# Patient Record
Sex: FEMALE | Race: WHITE | Employment: OTHER | URBAN - METROPOLITAN AREA
[De-identification: names, ages, dates, MRNs, and addresses within clinical notes are randomized per-mention and may not be internally consistent; named-entity substitution may affect disease eponyms.]

---

## 2017-07-24 ENCOUNTER — HOSPITAL ENCOUNTER (OUTPATIENT)
Dept: CT IMAGING | Age: 57
Discharge: HOME | End: 2017-07-24
Attending: NURSE PRACTITIONER
Payer: OTHER GOVERNMENT

## 2017-07-24 DIAGNOSIS — R91.1 LUNG NODULE: Chronic | ICD-10-CM

## 2017-07-24 PROCEDURE — 71250 CT THORAX DX C-: CPT

## 2017-07-24 NOTE — PROGRESS NOTES
Left message via voicemail to please give me a call back as soon as they are available. // Radha BARBER

## 2017-07-24 NOTE — PROGRESS NOTES
Notified patient of their CT scan results. Explained to the patient that per Riley Ivan the CT showed stable tiny left lung nodules and that Riley Ivan recommends for the patient to have a repeat CT scan in 1 year. Patient was fine with this and did not have any further questions. Order will be placed for 1 year. // Bethany BARBER

## 2017-10-13 PROBLEM — M54.2 CHRONIC NECK PAIN: Status: ACTIVE | Noted: 2017-04-17

## 2017-10-13 PROBLEM — G89.29 CHRONIC NECK PAIN: Status: ACTIVE | Noted: 2017-04-17

## 2018-01-19 PROBLEM — E03.9 ACQUIRED HYPOTHYROIDISM: Status: ACTIVE | Noted: 2018-01-19

## 2018-01-19 PROBLEM — E78.00 PURE HYPERCHOLESTEROLEMIA: Status: ACTIVE | Noted: 2018-01-19

## 2018-07-06 PROBLEM — R00.2 PALPITATIONS: Status: ACTIVE | Noted: 2018-07-06

## 2018-07-12 PROBLEM — I49.1 PAC (PREMATURE ATRIAL CONTRACTION): Status: ACTIVE | Noted: 2018-07-12

## 2018-07-12 PROBLEM — E78.2 MIXED HYPERLIPIDEMIA: Status: ACTIVE | Noted: 2018-07-12

## 2018-07-25 ENCOUNTER — HOSPITAL ENCOUNTER (OUTPATIENT)
Dept: CT IMAGING | Age: 58
End: 2018-07-25
Attending: NURSE PRACTITIONER
Payer: SELF-PAY

## 2018-07-25 ENCOUNTER — HOSPITAL ENCOUNTER (OUTPATIENT)
Dept: CT IMAGING | Age: 58
Discharge: HOME | End: 2018-07-25
Attending: INTERNAL MEDICINE
Payer: SELF-PAY

## 2018-07-25 DIAGNOSIS — I49.1 PAC (PREMATURE ATRIAL CONTRACTION): ICD-10-CM

## 2018-07-25 DIAGNOSIS — R00.2 PALPITATIONS: ICD-10-CM

## 2018-07-25 PROCEDURE — 75571 CT HRT W/O DYE W/CA TEST: CPT

## 2018-07-31 NOTE — PROGRESS NOTES
Called and informed pt of Calcium Score-72 indicating mild plaque. Reminded pt of F/U apt w/Dr Miguel Aguilar next mth. Pt voiced understanding.

## 2018-08-21 PROBLEM — R93.1 AGATSTON CORONARY ARTERY CALCIUM SCORE LESS THAN 100: Status: ACTIVE | Noted: 2018-08-21

## 2018-09-20 ENCOUNTER — HOSPITAL ENCOUNTER (OUTPATIENT)
Dept: CT IMAGING | Age: 58
Discharge: HOME | End: 2018-09-20
Attending: NURSE PRACTITIONER
Payer: OTHER GOVERNMENT

## 2018-09-20 DIAGNOSIS — R91.1 LUNG NODULE: ICD-10-CM

## 2018-09-20 PROCEDURE — 71250 CT THORAX DX C-: CPT

## 2018-09-20 NOTE — PROGRESS NOTES
Please let patient know that nodules are stable. She will need yearly screening CT of chest until smoke free for 15 years or until age 70 whichever comes first.  We will further address those recommendations at next visit.

## 2018-09-20 NOTE — PROGRESS NOTES
Pt was notified of the results. She was told that the nodules are stable. Pt verbalized understanding. She also agreed to annual CT scan. Order placed.

## 2019-07-18 ENCOUNTER — HOSPITAL ENCOUNTER (OUTPATIENT)
Dept: CT IMAGING | Age: 59
Discharge: HOME | End: 2019-07-18
Attending: NURSE PRACTITIONER

## 2019-07-18 DIAGNOSIS — R91.1 LUNG NODULE: Chronic | ICD-10-CM

## 2019-07-18 DIAGNOSIS — F17.210 CIGARETTE NICOTINE DEPENDENCE WITHOUT COMPLICATION: Chronic | ICD-10-CM

## 2019-12-24 ENCOUNTER — HOSPITAL ENCOUNTER (OUTPATIENT)
Dept: LAB | Age: 59
Discharge: HOME | End: 2019-12-24
Payer: OTHER GOVERNMENT

## 2019-12-24 PROBLEM — E83.52 HYPERCALCEMIA: Status: ACTIVE | Noted: 2019-12-24

## 2019-12-24 LAB
CALCIUM SERPL-MCNC: 10.6 MG/DL (ref 8.3–10.4)
PTH-INTACT SERPL-MCNC: 40.3 PG/ML (ref 18.5–88)

## 2019-12-24 PROCEDURE — 83970 ASSAY OF PARATHORMONE: CPT

## 2019-12-24 PROCEDURE — 36415 COLL VENOUS BLD VENIPUNCTURE: CPT

## 2020-09-01 ENCOUNTER — HOSPITAL ENCOUNTER (OUTPATIENT)
Dept: CT IMAGING | Age: 60
Discharge: HOME | End: 2020-09-01
Attending: FAMILY MEDICINE
Payer: OTHER GOVERNMENT

## 2020-09-01 DIAGNOSIS — R63.4 UNINTENTIONAL WEIGHT LOSS: ICD-10-CM

## 2020-09-01 DIAGNOSIS — R10.32 LLQ PAIN: ICD-10-CM

## 2020-09-01 DIAGNOSIS — R91.1 PULMONARY NODULE: ICD-10-CM

## 2020-09-01 DIAGNOSIS — F17.200 SMOKER: ICD-10-CM

## 2020-09-01 PROCEDURE — 74011000258 HC RX REV CODE- 258: Performed by: FAMILY MEDICINE

## 2020-09-01 PROCEDURE — 74011636320 HC RX REV CODE- 636/320: Performed by: FAMILY MEDICINE

## 2020-09-01 PROCEDURE — 74177 CT ABD & PELVIS W/CONTRAST: CPT

## 2020-09-01 PROCEDURE — 74011000636 HC RX REV CODE- 636: Performed by: FAMILY MEDICINE

## 2020-09-01 RX ORDER — SODIUM CHLORIDE 0.9 % (FLUSH) 0.9 %
10 SYRINGE (ML) INJECTION
Status: COMPLETED | OUTPATIENT
Start: 2020-09-01 | End: 2020-09-01

## 2020-09-01 RX ADMIN — Medication 10 ML: at 11:33

## 2020-09-01 RX ADMIN — DIATRIZOATE MEGLUMINE AND DIATRIZOATE SODIUM 15 ML: 660; 100 LIQUID ORAL; RECTAL at 11:33

## 2020-09-01 RX ADMIN — IOPAMIDOL 100 ML: 755 INJECTION, SOLUTION INTRAVENOUS at 11:33

## 2020-09-01 RX ADMIN — SODIUM CHLORIDE 100 ML: 900 INJECTION, SOLUTION INTRAVENOUS at 11:33

## 2020-10-05 ENCOUNTER — HOSPITAL ENCOUNTER (OUTPATIENT)
Dept: LAB | Age: 60
Discharge: HOME | End: 2020-10-05

## 2020-10-05 PROCEDURE — 88305 TISSUE EXAM BY PATHOLOGIST: CPT

## 2020-10-05 PROCEDURE — 88312 SPECIAL STAINS GROUP 1: CPT

## 2021-01-07 ENCOUNTER — HOSPITAL ENCOUNTER (OUTPATIENT)
Dept: LAB | Age: 61
Discharge: HOME | End: 2021-01-07
Payer: OTHER GOVERNMENT

## 2021-01-07 DIAGNOSIS — E78.00 PURE HYPERCHOLESTEROLEMIA: ICD-10-CM

## 2021-01-07 LAB
CHOLEST SERPL-MCNC: 252 MG/DL
HDLC SERPL-MCNC: 51 MG/DL (ref 40–60)
HDLC SERPL: 4.9 {RATIO}
LDLC SERPL CALC-MCNC: 153.8 MG/DL
LIPID PROFILE,FLP: ABNORMAL
TRIGL SERPL-MCNC: 236 MG/DL (ref 35–150)
VLDLC SERPL CALC-MCNC: 47.2 MG/DL (ref 6–23)

## 2021-01-07 PROCEDURE — 36415 COLL VENOUS BLD VENIPUNCTURE: CPT

## 2021-01-07 PROCEDURE — 80061 LIPID PANEL: CPT

## 2021-05-21 ENCOUNTER — HOSPITAL ENCOUNTER (OUTPATIENT)
Dept: MAMMOGRAPHY | Age: 61
Discharge: HOME | End: 2021-05-21
Attending: FAMILY MEDICINE

## 2021-05-21 DIAGNOSIS — Z12.31 ENCOUNTER FOR SCREENING MAMMOGRAM FOR MALIGNANT NEOPLASM OF BREAST: ICD-10-CM

## 2022-01-20 ENCOUNTER — HOSPITAL ENCOUNTER (OUTPATIENT)
Dept: CT IMAGING | Age: 62
Discharge: HOME | End: 2022-01-20
Attending: FAMILY MEDICINE
Payer: OTHER GOVERNMENT

## 2022-01-20 DIAGNOSIS — R91.1 PULMONARY NODULE: ICD-10-CM

## 2022-01-20 PROCEDURE — 71250 CT THORAX DX C-: CPT

## 2022-03-18 PROBLEM — E83.52 HYPERCALCEMIA: Status: ACTIVE | Noted: 2019-12-24

## 2022-03-18 PROBLEM — E03.9 ACQUIRED HYPOTHYROIDISM: Status: ACTIVE | Noted: 2018-01-19

## 2022-03-19 PROBLEM — M54.2 CHRONIC NECK PAIN: Status: ACTIVE | Noted: 2017-04-17

## 2022-03-19 PROBLEM — R93.1 AGATSTON CORONARY ARTERY CALCIUM SCORE LESS THAN 100: Status: ACTIVE | Noted: 2018-08-21

## 2022-03-19 PROBLEM — G89.29 CHRONIC NECK PAIN: Status: ACTIVE | Noted: 2017-04-17

## 2022-03-19 PROBLEM — E78.2 MIXED HYPERLIPIDEMIA: Status: ACTIVE | Noted: 2018-07-12

## 2022-03-19 PROBLEM — I49.1 PAC (PREMATURE ATRIAL CONTRACTION): Status: ACTIVE | Noted: 2018-07-12

## 2022-03-19 PROBLEM — E78.00 PURE HYPERCHOLESTEROLEMIA: Status: ACTIVE | Noted: 2018-01-19

## 2022-03-20 PROBLEM — R00.2 PALPITATIONS: Status: ACTIVE | Noted: 2018-07-06

## 2022-07-19 DIAGNOSIS — I10 HYPERTENSION, UNSPECIFIED TYPE: Primary | ICD-10-CM

## 2022-07-19 DIAGNOSIS — E03.9 ACQUIRED HYPOTHYROIDISM: ICD-10-CM

## 2022-07-19 DIAGNOSIS — E55.9 VITAMIN D DEFICIENCY: ICD-10-CM

## 2022-07-19 DIAGNOSIS — E78.00 PURE HYPERCHOLESTEROLEMIA: ICD-10-CM

## 2022-07-21 ENCOUNTER — NURSE ONLY (OUTPATIENT)
Dept: INTERNAL MEDICINE CLINIC | Facility: CLINIC | Age: 62
End: 2022-07-21

## 2022-07-21 DIAGNOSIS — E55.9 VITAMIN D DEFICIENCY: ICD-10-CM

## 2022-07-21 DIAGNOSIS — I10 HYPERTENSION, UNSPECIFIED TYPE: ICD-10-CM

## 2022-07-21 DIAGNOSIS — E03.9 ACQUIRED HYPOTHYROIDISM: ICD-10-CM

## 2022-07-21 DIAGNOSIS — E78.00 PURE HYPERCHOLESTEROLEMIA: Primary | ICD-10-CM

## 2022-07-21 LAB
25(OH)D3 SERPL-MCNC: 24.1 NG/ML (ref 30–100)
ALBUMIN SERPL-MCNC: 4.3 G/DL (ref 3.5–5)
ALBUMIN/GLOB SERPL: 1.1 {RATIO} (ref 1.2–3.5)
ALP SERPL-CCNC: 68 U/L (ref 50–136)
ALT SERPL-CCNC: 19 U/L (ref 12–65)
ANION GAP SERPL CALC-SCNC: 7 MMOL/L (ref 7–16)
AST SERPL-CCNC: 24 U/L (ref 15–37)
BILIRUB SERPL-MCNC: 0.5 MG/DL (ref 0.2–1.1)
BUN SERPL-MCNC: 15 MG/DL (ref 6–23)
CALCIUM SERPL-MCNC: 10 MG/DL (ref 8.3–10.4)
CHLORIDE SERPL-SCNC: 106 MMOL/L (ref 98–107)
CHOLEST SERPL-MCNC: 234 MG/DL
CO2 SERPL-SCNC: 24 MMOL/L (ref 21–32)
CREAT SERPL-MCNC: 0.9 MG/DL (ref 0.6–1)
CREAT UR-MCNC: 65 MG/DL
ERYTHROCYTE [DISTWIDTH] IN BLOOD BY AUTOMATED COUNT: 13 % (ref 11.9–14.6)
GLOBULIN SER CALC-MCNC: 3.8 G/DL (ref 2.3–3.5)
GLUCOSE SERPL-MCNC: 111 MG/DL (ref 65–100)
HCT VFR BLD AUTO: 44.4 % (ref 35.8–46.3)
HDLC SERPL-MCNC: 44 MG/DL (ref 40–60)
HDLC SERPL: 5.3 {RATIO}
HGB BLD-MCNC: 14.6 G/DL (ref 11.7–15.4)
LDLC SERPL CALC-MCNC: 140.4 MG/DL
MCH RBC QN AUTO: 34 PG (ref 26.1–32.9)
MCHC RBC AUTO-ENTMCNC: 32.9 G/DL (ref 31.4–35)
MCV RBC AUTO: 103.5 FL (ref 79.6–97.8)
MICROALBUMIN UR-MCNC: <0.5 MG/DL
MICROALBUMIN/CREAT UR-RTO: NORMAL MG/G
NRBC # BLD: 0 K/UL (ref 0–0.2)
PLATELET # BLD AUTO: 264 K/UL (ref 150–450)
PMV BLD AUTO: 11.1 FL (ref 9.4–12.3)
POTASSIUM SERPL-SCNC: 4.6 MMOL/L (ref 3.5–5.1)
PROT SERPL-MCNC: 8.1 G/DL (ref 6.3–8.2)
RBC # BLD AUTO: 4.29 M/UL (ref 4.05–5.2)
SODIUM SERPL-SCNC: 137 MMOL/L (ref 136–145)
TRIGL SERPL-MCNC: 248 MG/DL (ref 35–150)
TSH, 3RD GENERATION: 5.39 UIU/ML (ref 0.36–3.74)
VLDLC SERPL CALC-MCNC: 49.6 MG/DL (ref 6–23)
WBC # BLD AUTO: 7.7 K/UL (ref 4.3–11.1)

## 2022-07-27 NOTE — PROGRESS NOTES
2022    TELEHEALTH EVALUATION -- Audio/Visual (During SFZIE-44 public health emergency)    HPI:    Taras Favre (:  1960) has requested an audio/video evaluation for the following concern(s):    Chief Complaint   Patient presents with    Follow-up Chronic Condition     Hypothyroidism and Hyperlipidemia f/u not on any medication, asymptomatic. Wants to discuss about medication for cholesterol     Medication Refill     Premarin        Vitamin D deficiency  Taking supplement on a irregular basis. Current level:   Lab Results   Component Value Date/Time    VITD25 24.1 2022 07:57 AM      Hypothyroidism  The patient is a 63 y.o. female who is seen for follow up of hypothyroidism. Current symptoms: none. Patient denies change in energy level, diarrhea, heat / cold intolerance, nervousness, and palpitations. Symptoms are stable and well controlled. The patient is compliant. She also sees her endocrinologist regularly  Most recent lab:   Lab Results   Component Value Date    TSH 6.580 (H) 2022         Hypertension   She is exercising and is adherent to low salt diet. Cardiac symptoms: none. Cardiovascular risk factors: dyslipidemia and hypertension. Blood pressure is not measured at home. Hyperlipidemia  The patient is following a low fat diet and is exercising regularly. She is tolerating current treatment well and  is compliant but  Has not been able to get Repatha the past 2 months.   Patient is not having associated symptoms of shortness of breath, chest pain, rapid heart rate, irregular heart rate, and dizziness    Patient labs are YOUR LAST LIPID PROFILE:   Lab Results   Component Value Date    CHOL 234 (H) 2022    CHOL 131 2022    CHOL 129 2021     Lab Results   Component Value Date    TRIG 248 (H) 2022    TRIG 291 (H) 2022    TRIG 233 (H) 2021     Lab Results   Component Value Date    HDL 44 2022    HDL 51 2022    HDL 57 05/07/2021     Lab Results   Component Value Date    LDLCALC 140.4 (H) 07/21/2022    LDLCALC 37 01/20/2022    LDLCALC 36 05/07/2021     Lab Results   Component Value Date    LABVLDL 49.6 (H) 07/21/2022    LABVLDL 47.2 (H) 01/07/2021    LABVLDL 45 (H) 07/11/2019    VLDL 43 (H) 01/20/2022    VLDL 36 05/07/2021     Lab Results   Component Value Date    CHOLHDLRATIO 5.3 07/21/2022    CHOLHDLRATIO 4.9 01/07/2021     Pulmonary nodule  Being followed by pulmonary    GERD  stable    Review of Systems   Constitutional:  Negative for fatigue. HENT:  Negative for congestion. Respiratory:  Negative for shortness of breath. Cardiovascular:  Negative for chest pain. Gastrointestinal:  Negative for abdominal pain and blood in stool. Genitourinary:  Negative for difficulty urinating. Skin:  Negative for rash. Neurological:  Negative for headaches. Prior to Visit Medications    Medication Sig Taking? Authorizing Provider   estrogens, conjugated, (PREMARIN) 0.625 MG tablet Take 1 tablet by mouth in the morning.  Yes Monica Johnson MD   ergocalciferol (ERGOCALCIFEROL) 1.25 MG (46692 UT) capsule Take 50,000 Units by mouth every 7 days Yes Ar Automatic Reconciliation   Evolocumab (REPATHA SURECLICK) 079 MG/ML SOAJ Inject 140 mg into the skin every 14 days Yes Ar Automatic Reconciliation   fluticasone (FLONASE) 50 MCG/ACT nasal spray 2 sprays by Nasal route daily Yes Ar Automatic Reconciliation   lansoprazole (PREVACID) 15 MG delayed release capsule Take by mouth every morning (before breakfast) Yes Ar Automatic Reconciliation   metoprolol succinate (TOPROL XL) 25 MG extended release tablet Take 25 mg by mouth daily Yes Ar Automatic Reconciliation   valACYclovir (VALTREX) 500 MG tablet Take 500 mg by mouth 2 times daily Yes Ar Automatic Reconciliation       Social History     Tobacco Use    Smoking status: Every Day     Packs/day: 0.50     Types: Cigarettes    Smokeless tobacco: Never   Substance Use Topics Alcohol use: Yes     Alcohol/week: 2.0 standard drinks    Drug use: No            PHYSICAL EXAMINATION:  [ INSTRUCTIONS:  \"[x]\" Indicates a positive item  \"[]\" Indicates a negative item  -- DELETE ALL ITEMS NOT EXAMINED]  Vital Signs: (As obtained by patient/caregiver or practitioner observation)    Blood pressure-  Heart rate-    Respiratory rate-    Temperature-  Pulse oximetry-     Constitutional: [x] Appears well-developed and well-nourished [] No apparent distress      [] Abnormal-   Mental status  [x] Alert and awake  [] Oriented to person/place/time []Able to follow commands      Eyes:  EOM    []  Normal  [] Abnormal-  Sclera  []  Normal  [] Abnormal -         Discharge []  None visible  [] Abnormal -    HENT:   [] Normocephalic, atraumatic. [] Abnormal   [] Mouth/Throat: Mucous membranes are moist.     External Ears [] Normal  [] Abnormal-     Neck: [] No visualized mass     Pulmonary/Chest: [x] Respiratory effort normal.  [] No visualized signs of difficulty breathing or respiratory distress        [] Abnormal-      Musculoskeletal:   [] Normal gait with no signs of ataxia         [] Normal range of motion of neck        [] Abnormal-       Neurological:        [] No Facial Asymmetry (Cranial nerve 7 motor function) (limited exam to video visit)          [] No gaze palsy        [] Abnormal-         Skin:        [] No significant exanthematous lesions or discoloration noted on facial skin         [] Abnormal-            Psychiatric:       [] Normal Affect [] No Hallucinations        [] Abnormal-     Other pertinent observable physical exam findings-     ASSESSMENT/PLAN:   Diagnosis Orders   1. Postmenopausal HRT (hormone replacement therapy)  estrogens, conjugated, (PREMARIN) 0.625 MG tablet      2. Pure hypercholesterolemia  CBC with Auto Differential    Comprehensive Metabolic Panel    Lipid Panel      3. Agatston coronary artery calcium score less than 100        4. Acquired hypothyroidism  TSH      5. Vitamin D deficiency  Vitamin D 25 Hydroxy      6. Primary hypertension        7. Lung nodule        8. Need for hepatitis C screening test  Hepatitis C Antibody      9. Screening for human immunodeficiency virus  HIV 1/2 Ag/Ab, 4TH Generation,W Rflx Confirm      10. Impaired fasting blood sugar  Hemoglobin A1C        Data reviewed:  Previous notes, labs and Specialists notes, if any, reviewed and discussed with patient. HRT stable refill sent to pharmacy. HLD advised to call cardiology so that she can restart Repatha. Hypothyroid is well controlled. Continue vitamin D supplement. Try to take more regularly. Blood pressure is well  Lung nodule keep appointment with pulmonary. She is doing well chronic conditions are all stable. Plan for physical in about 6 months with labs prior to that visit. Advised to continue lifestyle changes: regular exercise at least 150 mins a week, well balanced diet rich in grains, fruits and vegetables, get at least 6-8 hrs of sleep a night. Opportunity to ask questions was offered and were answered to the best of my ability. Return in about 6 months (around 1/28/2023) for CPE, lab prior to visit. Kiannamariposa Dubose, was evaluated through a synchronous (real-time) audio-video encounter. The patient (or guardian if applicable) is aware that this is a billable service, which includes applicable co-pays. This Virtual Visit was conducted with patient's (and/or legal guardian's) consent. The visit was conducted pursuant to the emergency declaration under the [facility] authority and the [employer] General Act. Patient identification was verified, and a caregiver was present when appropriate. The patient was located at Home: [address] [place] [address]. Provider was located at [facility] (Appt Dept): [address] [place], [place] [address],  [address]        Total time spent on this encounter: Not billed by time    --Eugene Llanes MD on 7/28/2022 at 3:44 PM    An electronic signature was used to authenticate this note.

## 2022-07-28 ENCOUNTER — TELEMEDICINE (OUTPATIENT)
Dept: INTERNAL MEDICINE CLINIC | Facility: CLINIC | Age: 62
End: 2022-07-28
Payer: OTHER GOVERNMENT

## 2022-07-28 DIAGNOSIS — Z11.59 NEED FOR HEPATITIS C SCREENING TEST: ICD-10-CM

## 2022-07-28 DIAGNOSIS — Z79.890 POSTMENOPAUSAL HRT (HORMONE REPLACEMENT THERAPY): Primary | ICD-10-CM

## 2022-07-28 DIAGNOSIS — R91.1 LUNG NODULE: ICD-10-CM

## 2022-07-28 DIAGNOSIS — Z11.4 SCREENING FOR HUMAN IMMUNODEFICIENCY VIRUS: ICD-10-CM

## 2022-07-28 DIAGNOSIS — E55.9 VITAMIN D DEFICIENCY: ICD-10-CM

## 2022-07-28 DIAGNOSIS — E78.00 PURE HYPERCHOLESTEROLEMIA: ICD-10-CM

## 2022-07-28 DIAGNOSIS — I10 PRIMARY HYPERTENSION: ICD-10-CM

## 2022-07-28 DIAGNOSIS — R73.01 IMPAIRED FASTING BLOOD SUGAR: ICD-10-CM

## 2022-07-28 DIAGNOSIS — E03.9 ACQUIRED HYPOTHYROIDISM: ICD-10-CM

## 2022-07-28 DIAGNOSIS — R93.1 AGATSTON CORONARY ARTERY CALCIUM SCORE LESS THAN 100: ICD-10-CM

## 2022-07-28 PROBLEM — M54.12 CERVICAL RADICULOPATHY: Status: ACTIVE | Noted: 2017-04-17

## 2022-07-28 PROCEDURE — 99214 OFFICE O/P EST MOD 30 MIN: CPT | Performed by: FAMILY MEDICINE

## 2022-07-28 ASSESSMENT — ENCOUNTER SYMPTOMS
BLOOD IN STOOL: 0
ABDOMINAL PAIN: 0
SHORTNESS OF BREATH: 0

## 2022-07-28 ASSESSMENT — PATIENT HEALTH QUESTIONNAIRE - PHQ9
SUM OF ALL RESPONSES TO PHQ9 QUESTIONS 1 & 2: 0
SUM OF ALL RESPONSES TO PHQ QUESTIONS 1-9: 0
SUM OF ALL RESPONSES TO PHQ QUESTIONS 1-9: 0
1. LITTLE INTEREST OR PLEASURE IN DOING THINGS: 0
2. FEELING DOWN, DEPRESSED OR HOPELESS: 0
SUM OF ALL RESPONSES TO PHQ QUESTIONS 1-9: 0
DEPRESSION UNABLE TO ASSESS: PT REFUSES
SUM OF ALL RESPONSES TO PHQ QUESTIONS 1-9: 0

## 2022-08-10 ENCOUNTER — OFFICE VISIT (OUTPATIENT)
Dept: CARDIOLOGY CLINIC | Age: 62
End: 2022-08-10
Payer: OTHER GOVERNMENT

## 2022-08-10 VITALS
SYSTOLIC BLOOD PRESSURE: 140 MMHG | HEIGHT: 66 IN | BODY MASS INDEX: 20.57 KG/M2 | WEIGHT: 128 LBS | DIASTOLIC BLOOD PRESSURE: 84 MMHG

## 2022-08-10 DIAGNOSIS — I49.1 PAC (PREMATURE ATRIAL CONTRACTION): Primary | ICD-10-CM

## 2022-08-10 DIAGNOSIS — R00.2 PALPITATIONS: ICD-10-CM

## 2022-08-10 DIAGNOSIS — I10 PRIMARY HYPERTENSION: ICD-10-CM

## 2022-08-10 PROCEDURE — 99213 OFFICE O/P EST LOW 20 MIN: CPT | Performed by: INTERNAL MEDICINE

## 2022-08-10 PROCEDURE — 93000 ELECTROCARDIOGRAM COMPLETE: CPT | Performed by: INTERNAL MEDICINE

## 2022-08-10 RX ORDER — METOPROLOL SUCCINATE 25 MG/1
25 TABLET, EXTENDED RELEASE ORAL DAILY
Qty: 90 TABLET | Refills: 5 | Status: SHIPPED | OUTPATIENT
Start: 2022-08-10

## 2022-08-10 RX ORDER — EVOLOCUMAB 140 MG/ML
140 INJECTION, SOLUTION SUBCUTANEOUS
Qty: 6 PEN | Refills: 3 | Status: SHIPPED | OUTPATIENT
Start: 2022-08-10

## 2022-08-10 ASSESSMENT — ENCOUNTER SYMPTOMS: SHORTNESS OF BREATH: 0

## 2022-08-10 NOTE — PROGRESS NOTES
[address], [facility], [address] [place] [address]  PHONE: [phone]    Vincent Daniel  1960      SUBJECTIVE:   Vincent Daniel is a 63 y.o. female seen for a follow up visit regarding the following:     Chief Complaint   Patient presents with    Hypertension     David Scott        HPI:    82-year-old female comes back for follow-up with history of familial hyperlipidemia mild elevated calcium score. She been completely intolerant of statins but we had her approved for Repatha which really dropped her cholesterol way down to 52-1 29. However her company would not renew it without another preauthorization she has been out of it and has gone back up to nearly 250 with LDL over 140 without the drug. She is clinically been doing well with no chest pain or shortness of breath      Past Medical History, Past Surgical History, Family history, Social History, and Medications were all reviewed with the patient today and updated as necessary.        Allergies   Allergen Reactions    Sulfa Antibiotics Anaphylaxis     Past Medical History:   Diagnosis Date    Acquired hypothyroidism 1/19/2018    Hepatitis C 2015    treatment completed    Hypertension     Polycythemia     Pure hypercholesterolemia 1/19/2018    Thyroid disease      Past Surgical History:   Procedure Laterality Date    ABDOMINOPLASTY      CERVICAL DISCECTOMY  05/03/2017    Dr Halima Aquino, 1992     CHOLECYSTECTOMY  2003    COLONOSCOPY  10/2020    repeat in 5 years    HYSTERECTOMY ([address])  [address]ain [address]  2013    UPPER GASTROINTESTINAL ENDOSCOPY  10/2020     Family History   Problem Relation Age of Onset    Heart Disease Father     Cancer Paternal Grandfather         lung    Lung Disease Father     Heart Disease Maternal Grandfather     Lung Disease Paternal Grandfather     Kidney Disease Maternal Grandmother     Cancer Father         lung      Social History     Tobacco Use    Smoking status: Every Day Packs/day: 0.50     Types: Cigarettes    Smokeless tobacco: Never   Substance Use Topics    Alcohol use: Yes     Alcohol/week: 2.0 standard drinks       ROS:    Review of Systems   Constitutional: Negative for decreased appetite and weight loss. Cardiovascular:  Negative for chest pain, dyspnea on exertion and irregular heartbeat. Respiratory:  Negative for shortness of breath. PHYSICAL EXAM:    BP (!) 140/84   Ht 5' 6\" (1.676 m)   Wt 128 lb (58.1 kg)   BMI 20.66 kg/m²        Wt Readings from Last 3 Encounters:   08/10/22 128 lb (58.1 kg)   05/13/21 136 lb 8 oz (61.9 kg)     BP Readings from Last 3 Encounters:   08/10/22 (!) 140/84   05/13/21 128/80         Physical Exam  Constitutional:       General: She is not in acute distress. Cardiovascular:      Rate and Rhythm: Normal rate and regular rhythm. Heart sounds:     No gallop. Pulmonary:      Effort: Pulmonary effort is normal.      Breath sounds: No rales. Musculoskeletal:         General: No swelling. Neurological:      Mental Status: She is alert. Medical problems and test results were reviewed with the patient today. Results for orders placed or performed in visit on 08/10/22   EKG 12 Lead    Impression    Normal sinus rhythm rate of 70. Low voltage. Poor R wave progression. No significant ST changes. Lab Results   Component Value Date/Time     07/21/2022 07:57 AM    K 4.6 07/21/2022 07:57 AM     07/21/2022 07:57 AM    CO2 24 07/21/2022 07:57 AM    BUN 15 07/21/2022 07:57 AM    GFRAA >60 07/21/2022 07:57 AM     Lab Results   Component Value Date/Time    CHOL 234 07/21/2022 07:57 AM    HDL 44 07/21/2022 07:57 AM    VLDL 43 01/20/2022 07:15 AM         ASSESSMENT and Brett Smithlavern was seen today for hypertension. Diagnoses and all orders for this visit:    PAC (premature atrial contraction) currently asymptomatic EKG shows no ectopy.   -     EKG 12 Lead    Hypertension she will stay on Toprol at this time -     EKG 12 Lead    Palpitations symptoms are currently noted EKG shows sinus rhythm poor wave progression no significant changes noted  -     EKG 12 Lead  Familial hyperlipidemia patient responded very well to Repatha as noted above. Off the drug she is back up to 240 LDLs over 140. We will try to reapply for approval for Repatha send that in for her. Other orders  -     Evolocumab (REPATHA SURECLICK) 871 MG/ML SOAJ; Inject 140 mg into the skin every 14 days        [unfilled]      No follow-up provider specified.     Eliane Lennox, MD  8/10/2022  12:21 PM

## 2022-08-25 ENCOUNTER — TELEPHONE (OUTPATIENT)
Dept: CARDIOLOGY CLINIC | Age: 62
End: 2022-08-25

## 2022-08-25 NOTE — TELEPHONE ENCOUNTER
Called [facility] - [place] completed by phone. AUTHORIZATION APPROVED.     Auth # [id number] valid 7/26/2022 - 8/25/2023./kp

## 2022-09-30 DIAGNOSIS — R09.82 POST-NASAL DRIP: Primary | ICD-10-CM

## 2022-09-30 RX ORDER — VALACYCLOVIR HYDROCHLORIDE 500 MG/1
500 TABLET, FILM COATED ORAL 2 TIMES DAILY
Qty: 180 TABLET | Refills: 0 | Status: SHIPPED | OUTPATIENT
Start: 2022-09-30

## 2022-09-30 RX ORDER — FLUTICASONE PROPIONATE 50 MCG
2 SPRAY, SUSPENSION (ML) NASAL DAILY
Qty: 3 EACH | Refills: 1 | Status: SHIPPED | OUTPATIENT
Start: 2022-09-30

## 2022-09-30 NOTE — TELEPHONE ENCOUNTER
Requested Prescriptions     Pending Prescriptions Disp Refills    fluticasone (FLONASE) 50 MCG/ACT nasal spray 3 each 1     Si sprays by Nasal route daily    valACYclovir (VALTREX) 500 MG tablet 180 tablet 0     Sig: Take 1 tablet by mouth 2 times daily Prn     Dose verified and to [facility]

## 2022-09-30 NOTE — TELEPHONE ENCOUNTER
----- Message from Zohaib Quintero sent at 9/30/2022 10:33 AM EDT -----  Subject: Refill Request    QUESTIONS  Name of Medication? valACYclovir (VALTREX) 500 MG tablet  Patient-reported dosage and instructions? unknown  How many days do you have left? 0  Preferred Pharmacy? [facility] [facility] phone number (if available)? [phone]  ---------------------------------------------------------------------------  --------------,  Name of Medication? fluticasone (FLONASE) 50 MCG/ACT nasal spray  Patient-reported dosage and instructions? unknown  How many days do you have left? 0  Preferred Pharmacy? [facility] phone number (if available)? [phone]  Additional Information for Provider? 90 day supply. Please call the number   celio below. ---------------------------------------------------------------------------  --------------  Raptr  What is the best way for the office to contact you? OK to leave message on   voicemail  Preferred Call Back Phone Number? [phone]  ---------------------------------------------------------------------------  --------------  SCRIPT ANSWERS  Relationship to Patient? Third Party  Third Party Type? Pharmacy? Representative Name?  Xiao Pleitez

## 2022-11-28 NOTE — PROGRESS NOTES
2022    TELEHEALTH EVALUATION -- Audio/Visual (During OZKAH-86 public health emergency)    HPI:    Alden Colin (:  1960) has requested an audio/video evaluation for the following concern(s):    Chief Complaint   Patient presents with    Pneumonia     [facility] . Given Erythromycin, Amoxicillin, and albuterol. Amoxicillin caused vomiting.  follow up  - Dx Pneumonia, unable to tolerate erythromycin and amoxicillin. Coughing for about 2 weeks before going to . Became so SOB and cough worse. Dx Right pneumonia. Using albuterol, cough meds. Completed erythromycin, only 4 days of amoxicillin. Still gets SOB. Quit smoking 6 months ago. States she was treated for a UTI several weeks ago and given Keflex. She developed an allergic reaction consisting of swelling of her lips with blisters. Review of Systems   Constitutional:  Negative for fatigue. HENT:  Negative for congestion. Respiratory:  Positive for cough, shortness of breath and wheezing. Cardiovascular:  Negative for chest pain. Gastrointestinal:  Negative for abdominal pain and blood in stool. Genitourinary:  Negative for difficulty urinating. Skin:  Negative for rash. Neurological:  Negative for headaches. Prior to Visit Medications    Medication Sig Taking? Authorizing Provider   albuterol sulfate HFA (PROVENTIL;VENTOLIN;PROAIR) 108 (90 Base) MCG/ACT inhaler  Yes Historical Provider, MD   doxycycline hyclate (VIBRA-TABS) 100 MG tablet Take 1 tablet by mouth 2 times daily for 10 days Yes Samantha Oliver MD   predniSONE 10 MG (21) TBPK Take 40 mg by mouth daily for 2 days, THEN 30 mg daily for 2 days, THEN 20 mg daily for 2 days, THEN 10 mg daily for 1 day.  Yes Samantha Oliver MD   guaiFENesin-dextromethorphan [facility] DM) 100-10 MG/5ML syrup Take 5 mLs by mouth 4 times daily as needed for Cough Yes Samantha Oliver MD   fluticasone (FLONASE) 50 MCG/ACT nasal spray 2 sprays by Nasal route daily Yes Kranthi Tenorio MD   valACYclovir (VALTREX) 500 MG tablet Take 1 tablet by mouth 2 times daily Prn Yes Kranthi Tenorio MD   Evolocumab (REPATHA SURECLICK) 694 MG/ML SOAJ Inject 140 mg into the skin every 14 days Yes Panda Patricia MD   Cyanocobalamin (VITAMIN B-12 PO) Take by mouth Yes Monica Ervin MD   metoprolol succinate (TOPROL XL) 25 MG extended release tablet Take 1 tablet by mouth in the morning. Yes Panda Patricia MD   lansoprazole (PREVACID) 15 MG delayed release capsule Take by mouth every morning (before breakfast) Yes Ar Automatic Reconciliation   estrogens, conjugated, (PREMARIN) 0.625 MG tablet Take 1 tablet by mouth in the morning. Jam Reyna MD   ergocalciferol (ERGOCALCIFEROL) 1.25 MG (93622 UT) capsule Take 50,000 Units by mouth every 7 days  Patient not taking: No sig reported  Ar Automatic Reconciliation       Social History     Tobacco Use    Smoking status: Every Day     Packs/day: 0.50     Types: Cigarettes    Smokeless tobacco: Never   Substance Use Topics    Alcohol use: Yes     Alcohol/week: 2.0 standard drinks    Drug use: No            PHYSICAL EXAMINATION:  [ INSTRUCTIONS:  \"[x]\" Indicates a positive item  \"[]\" Indicates a negative item  -- DELETE ALL ITEMS NOT EXAMINED]  Vital Signs: (As obtained by patient/caregiver or practitioner observation)    Blood pressure-  Heart rate-    Respiratory rate-    Temperature-  Pulse oximetry-     Constitutional: [x] Appears well-developed and well-nourished [] No apparent distress      [] Abnormal-   Mental status  [x] Alert and awake  [] Oriented to person/place/time []Able to follow commands      Eyes:  EOM    []  Normal  [] Abnormal-  Sclera  []  Normal  [] Abnormal -         Discharge []  None visible  [] Abnormal -    HENT:   [] Normocephalic, atraumatic.   [] Abnormal   [] Mouth/Throat: Mucous membranes are moist.     External Ears [] Normal  [] Abnormal-     Neck: [] No visualized mass Pulmonary/Chest: [] Respiratory effort normal.  [x] No visualized signs of difficulty breathing or respiratory distress        [x] Abnormal-coughing intermittently during the exam.     Musculoskeletal:   [] Normal gait with no signs of ataxia         [] Normal range of motion of neck        [] Abnormal-       Neurological:        [] No Facial Asymmetry (Cranial nerve 7 motor function) (limited exam to video visit)          [] No gaze palsy        [] Abnormal-         Skin:        [] No significant exanthematous lesions or discoloration noted on facial skin         [] Abnormal-            Psychiatric:       [] Normal Affect [] No Hallucinations        [] Abnormal-     Other pertinent observable physical exam findings-     ASSESSMENT/PLAN:   Diagnosis Orders   1. Pneumonia of right lung due to infectious organism, unspecified part of lung  doxycycline hyclate (VIBRA-TABS) 100 MG tablet    predniSONE 10 MG (21) TBPK    guaiFENesin-dextromethorphan (ROBITUSSIN DM) 100-10 MG/5ML syrup      2. Allergic reaction due to antibacterial drug          Pneumonia. Unable to complete her antibiotics. Start her on doxycycline 100 mg twice a day for 10 days. Prednisone taper pack and Robitussin DM for cough. She is advised to continue her inhaler use as needed. Let me know how she is doing in the next 4 to 5 days. Consider repeating an x-ray if there is progression in symptoms. I have added allergies to Keflex into her chart. No follow-ups on file. Jorge L Medina, was evaluated through a synchronous (real-time) audio-video encounter. The patient (or guardian if applicable) is aware that this is a billable service, which includes applicable co-pays. This Virtual Visit was conducted with patient's (and/or legal guardian's) consent.  The visit was conducted pursuant to the emergency declaration under the [facility] waiver authority and the [facility] and Response Supplemental Appropriations Act. Patient identification was verified, and a caregiver was present when appropriate. The patient was located at Home:  [address]. Provider was located at [facility] (Appt Dept): [address] [place] [address],  [address]. Total time spent on this encounter: Not billed by time    --Carlos Chu MD on 11/30/2022 at 8:58 AM    An electronic signature was used to authenticate this note.

## 2022-11-30 ENCOUNTER — TELEMEDICINE (OUTPATIENT)
Dept: INTERNAL MEDICINE CLINIC | Facility: CLINIC | Age: 62
End: 2022-11-30
Payer: OTHER GOVERNMENT

## 2022-11-30 DIAGNOSIS — T36.95XA ALLERGIC REACTION DUE TO ANTIBACTERIAL DRUG: ICD-10-CM

## 2022-11-30 DIAGNOSIS — J18.9 PNEUMONIA OF RIGHT LUNG DUE TO INFECTIOUS ORGANISM, UNSPECIFIED PART OF LUNG: Primary | ICD-10-CM

## 2022-11-30 PROBLEM — M54.2 CHRONIC NECK PAIN: Status: RESOLVED | Noted: 2017-04-17 | Resolved: 2022-11-30

## 2022-11-30 PROBLEM — G89.29 CHRONIC NECK PAIN: Status: RESOLVED | Noted: 2017-04-17 | Resolved: 2022-11-30

## 2022-11-30 PROCEDURE — 99214 OFFICE O/P EST MOD 30 MIN: CPT | Performed by: FAMILY MEDICINE

## 2022-11-30 RX ORDER — DOXYCYCLINE HYCLATE 100 MG
100 TABLET ORAL 2 TIMES DAILY
Qty: 20 TABLET | Refills: 0 | Status: SHIPPED | OUTPATIENT
Start: 2022-11-30 | End: 2022-12-10

## 2022-11-30 RX ORDER — GUAIFENESIN/DEXTROMETHORPHAN 100-10MG/5
5 SYRUP ORAL 4 TIMES DAILY PRN
Qty: 200 ML | Refills: 0 | Status: SHIPPED | OUTPATIENT
Start: 2022-11-30 | End: 2022-12-10

## 2022-11-30 RX ORDER — ALBUTEROL SULFATE 90 UG/1
AEROSOL, METERED RESPIRATORY (INHALATION)
COMMUNITY
Start: 2022-11-23

## 2022-11-30 RX ORDER — PREDNISONE 10 MG/1
TABLET ORAL
Qty: 21 EACH | Refills: 0 | Status: SHIPPED | OUTPATIENT
Start: 2022-11-30 | End: 2022-12-07

## 2022-11-30 ASSESSMENT — ENCOUNTER SYMPTOMS
WHEEZING: 1
COUGH: 1
SHORTNESS OF BREATH: 1
BLOOD IN STOOL: 0
ABDOMINAL PAIN: 0

## 2023-01-23 ENCOUNTER — HOSPITAL ENCOUNTER (OUTPATIENT)
Dept: CT IMAGING | Age: 63
Discharge: HOME | End: 2023-01-26

## 2023-01-23 DIAGNOSIS — R91.1 LUNG NODULE: ICD-10-CM

## 2023-02-01 NOTE — PROGRESS NOTES
Name:  Reed Hammond  YOB: 1960   MRN: [id number]      Office Visit: 2/2/2023        ASSESSMENT AND PLAN:  (Medical Decision Making)    Impression:     1. Centrilobular emphysema (HCC)  --mild emphysema changes on CT scan. No obstruction on spirometry. Maintained on prn albuterol only. Has quit smoking. 2. Personal history of tobacco use  --Discussed with patient current guidelines for screening for lung cancer. Current recommendations are to obtain yearly screening LDCT yearly from age 49-80, or until smoke free for 15 years. Patient has 20 pack year history of cigarette smoking and currently smoke free since 2022. Discussed with patient risks and benefits of screening, including over-diagnosis, false positive rate, and total radiation exposure. Patient currently exhibits no signs or symptoms suggestive of lung cancer. Discussed with patient importance of compliance with yearly annual lung cancer screenings and willingness to undergo diagnosis and treatment if screening scan is positive. In addition, patient was counseled regarding remaining smoke free. --LDCT due 1/2024  - MS VISIT TO DISCUSS LUNG CA SCREEN W LDCT  - CT Lung Screen (Annual/Baseline); Future    3. Snoring  --further work up is needed in this patient with DHS, fatigue, snoring, witnessed apneas. Will obtain HST if insurance allows. - Ambulatory Referral to Sleep Studies    4. Solitary pulmonary nodule  --4 mm RUL nodule. Stable x 1 year. No further follow up of this is needed. Procedures    MS VISIT TO DISCUSS LUNG CA SCREEN W LDCT     Follow-up and Dispositions    Return in about 1 year (around 2/2/2024) for Lola, COPD, lung nodule, Arrive 15 minutes prior to appt time, spirometry. Collaborating physician is Dr. Gustavo Baker.     ADS    Ressie Smoke, NP, APRN - CNP    No specialty comments available.      _________________________________________________________________________    HISTORY OF PRESENT ILLNESS:    Ms. Christian Alva is a 63 y.o. female who is seen at [facility] today for  Pulmonary Nodule     The patient is a 63year old female who is seen for follow up of tobacco use and lung nodule. Has a history of 2-3 mm RUL nodule in 2016. This had been stable on CT from 7793-2394 and no further CT scans were planned. However, she had Covid  in January 2022 and CT of chest revealed that nodule had increased in size to 3.8 mm in the RUL. She has a 20 pack year history of cigarette smoking, but quit smoking 7/2022. She denies any fever, chills, or night sweats. No hemoptysis. No cough. Most recent CT scan of her chest was done 1/23/2023 which revealed a 4 mm right apex nodule and mild emphysematous changes. Denies any cough or wheezing. No PEÑALOZA.  wants her to have sleep study due to snoring and witnessed apneas. She reports DHS and fatigue and nonrefreshing sleep. REVIEW OF SYSTEMS: 10 point review of systems is negative except as reported in HPI. PHYSICAL EXAM: Body mass index is 21.4 kg/m². Vitals:    02/02/23 0849   BP: 132/74   Pulse: 74   Resp: 18   Temp: 97.7 °F (36.5 °C)   TempSrc: Skin   SpO2: 97%   Weight: 132 lb 9.6 oz (60.1 kg)   Height: 5' 6\" (1.676 m)         General:   Alert, cooperative, no distress, appears stated age. Eyes:   Conjunctivae/corneas clear. PERRL        Mouth/Throat:  Lips, mucosa, and tongue normal. Teeth and gums normal.        Lungs:     Breath sounds clear bilaterally. Heart:   Regular rate and rhythm, S1, S2 normal, no murmur, click, rub or gallop. Abdomen:    Soft, non-tender. Extremities:  Extremities normal, atraumatic, no cyanosis or edema.      Skin:  Skin color normal. No rashes or lesions     Neurologic:  A&Ox3     DIAGNOSTIC TESTS: LABS:   Lab Results   Component Value Date/Time    WBC 7.7 07/21/2022 07:57 AM    HGB 14.6 07/21/2022 07:57 AM    HCT 44.4 07/21/2022 07:57 AM     07/21/2022 07:57 AM    TSH 6.580 01/20/2022 07:15 AM     Imaging: I performed an independent interpretation of the patient's images. CXR: No results found for this or any previous visit from the past 3650 days. CT Chest:   CT CHEST WO CONTRAST 01/23/2023    Narrative  EXAMINATION: CT CHEST WO CONTRAST 1/23/2023 9:47 AM    ACCESSION NUMBER: [id number]    COMPARISON: Most recent chest CT dated 1/20/2022    INDICATION: Solitary pulmonary nodule    TECHNIQUE: Multiple contiguous axial CT images of the chest were obtained from  the lung apices to the lung bases without intravenous contrast.  Reformats are  provided. Radiation dose reduction techniques were used for this study. Our CT scanners  use one or all of the following: Automated exposure control, adjustment of the  mA and/or kV according to patient size, iterative reconstruction. FINDINGS:  AIRWAYS: The central airways are patent. LUNGS: Minimal nonspecific reticulation in the lower lobes, unchanged. No  airspace consolidation. Mild emphysematous changes. 4 mm pulmonary nodule within  the right apex is unchanged. No new or enlarging pulmonary nodules. PLEURA: No pleural effusion or pneumothorax. HEART: The heart is not enlarged. Mild coronary artery calcifications. No  pericardial effusion. THORACIC AORTA: The aorta is normal in caliber. Minimal atherosclerosis within  the aorta and great vessels. PULMONARY ARTERY: The main pulmonary artery is normal in caliber. MEDIASTINUM/LIZBETH: No mediastinal mass or lymphadenopathy. CHEST WALL: No mass or axillary lymphadenopathy. UPPER ABDOMEN: Gallbladder surgically absent. BONES: No suspicious osseous lesion. Impression  No new or enlarging pulmonary nodules.         [facility] Reference Info: Past Medical History:   Diagnosis Date    Acquired hypothyroidism 2018    Hepatitis C 2015    treatment completed    Hypertension     Polycythemia     Pure hypercholesterolemia 2018    Thyroid disease         Tobacco Use      Smoking status: Former        Packs/day: 0.50        Years: 40.00        Pack years: 20        Types: Cigarettes        Quit date: 2022        Years since quittin.5      Smokeless tobacco: Never    Allergies   Allergen Reactions    Keflex [Cephalexin] Angioedema     Lips swelling    Sulfa Antibiotics Anaphylaxis     Current Outpatient Medications   Medication Instructions    albuterol sulfate HFA (PROVENTIL;VENTOLIN;PROAIR) 108 (90 Base) MCG/ACT inhaler No dose, route, or frequency recorded.     Cyanocobalamin (VITAMIN B-12 PO) Oral    ergocalciferol (ERGOCALCIFEROL) 50,000 Units, EVERY 7 DAYS    estrogens (conjugated) (PREMARIN) 0.625 mg, Oral, DAILY    fluticasone (FLONASE) 50 MCG/ACT nasal spray 2 sprays, Nasal, DAILY    lansoprazole (PREVACID) 15 MG delayed release capsule Oral, DAILY BEFORE BREAKFAST    metoprolol succinate (TOPROL XL) 25 mg, Oral, DAILY    phenazopyridine (PYRIDIUM) 200 mg, Oral, 3 TIMES DAILY    Repatha SureClick 807 mg, SubCUTAneous, EVERY 14 DAYS    valACYclovir (VALTREX) 500 mg, Oral, 2 TIMES DAILY, Prn

## 2023-02-02 ENCOUNTER — OFFICE VISIT (OUTPATIENT)
Dept: PULMONOLOGY | Age: 63
End: 2023-02-02
Payer: OTHER GOVERNMENT

## 2023-02-02 VITALS
HEART RATE: 74 BPM | TEMPERATURE: 97.7 F | WEIGHT: 132.6 LBS | OXYGEN SATURATION: 97 % | HEIGHT: 66 IN | SYSTOLIC BLOOD PRESSURE: 132 MMHG | DIASTOLIC BLOOD PRESSURE: 74 MMHG | BODY MASS INDEX: 21.31 KG/M2 | RESPIRATION RATE: 18 BRPM

## 2023-02-02 DIAGNOSIS — Z87.891 PERSONAL HISTORY OF TOBACCO USE: ICD-10-CM

## 2023-02-02 DIAGNOSIS — R06.83 SNORING: ICD-10-CM

## 2023-02-02 DIAGNOSIS — J43.2 CENTRILOBULAR EMPHYSEMA (HCC): Primary | ICD-10-CM

## 2023-02-02 DIAGNOSIS — R91.1 SOLITARY PULMONARY NODULE: ICD-10-CM

## 2023-02-02 PROCEDURE — G0296 VISIT TO DETERM LDCT ELIG: HCPCS | Performed by: NURSE PRACTITIONER

## 2023-02-02 PROCEDURE — 3078F DIAST BP <80 MM HG: CPT | Performed by: NURSE PRACTITIONER

## 2023-02-02 PROCEDURE — 99214 OFFICE O/P EST MOD 30 MIN: CPT | Performed by: NURSE PRACTITIONER

## 2023-02-02 PROCEDURE — 3075F SYST BP GE 130 - 139MM HG: CPT | Performed by: NURSE PRACTITIONER

## 2023-02-02 RX ORDER — PHENAZOPYRIDINE HYDROCHLORIDE 200 MG/1
200 TABLET, FILM COATED ORAL 3 TIMES DAILY
COMMUNITY
Start: 2022-08-28

## 2023-02-02 NOTE — PATIENT INSTRUCTIONS
I have ordered screening CT of chest due 1/25/2024. You should receive a call from scheduling within 2-3 business days. If you do not hear from them, please call [phone] to schedule your test.      Learning About Lung Cancer Screening  What is screening for lung cancer? Lung cancer screening is a way to find some lung cancers early, before a person has any symptoms of the cancer. Lung cancer screening may help those who have the highest risk for lung cancer--people age 48 and older who are or were heavy smokers. For most people, who aren't at increased risk, screening for lung cancer probably isn't helpful. Screening won't prevent cancer. And it may not find all lung cancers. Lung cancer screening may lower the risk of dying from lung cancer in a small number of people. How is it done? Lung cancer screening is done with a low-dose CT (computed tomography) scan. A CT scan uses X-rays, or radiation, to make detailed pictures of your body. Experts recommend that screening be done in medical centers that focus on finding and treating lung cancer. Who is screening recommended for? Lung cancer screening is recommended for people age 48 and older who are or were heavy smokers. That means people with a smoking history of at least 20 pack years. A pack year is a way to measure how heavy a smoker you are or were. To figure out your pack years, multiply how many packs a day on average (assuming 20 cigarettes per pack) you have smoked by how many years you have smoked. For example: If you smoked 1 pack a day for 20 years, that's 1 times 20. So you have a smoking history of 20 pack years. If you smoked 2 packs a day for 10 years, that's 2 times 10. So you have a smoking history of 20 pack years. Experts agree that screening is for people who have a high risk of lung cancer. But experts don't agree on what high risk means.  Some say people age 48 or older with at least a 20-pack-year smoking history are high risk. Others say it's people age 54 or older with a 30-pack-year history. To see if you could benefit from screening, first find out if you are at high risk for lung cancer. Your doctor can help you decide your lung cancer risk. What are the risks of screening? CT screening for lung cancer isn't perfect. It can show an abnormal result when it turns out there wasn't any cancer. This is called a false-positive result. This means you may need more tests to make sure you don't have cancer. These tests can be harmful and cause a lot of worry. These tests may include more CT scans and invasive testing like a lung biopsy. In a biopsy, the doctor takes a sample of tissue from inside your lung so it can be looked at under a microscope. A biopsy is the only way to tell if you have lung cancer. If the biopsy finds cancer, you and your doctor will have to decide how or whether to treat it. Some lung cancers found on CT scans are harmless and would not have caused a problem if they had not been found through screening. But because doctors can't tell which ones will turn out to be harmless, most will be treated. This means that you may get treatment--including surgery, radiation, or chemotherapy--that you don't need. There is a risk of damage to cells or tissue from being exposed to radiation, including the small amounts used in CTs, X-rays, and other medical tests. Over time, exposure to radiation may cause cancer and other health problems. But in most cases, the risk of getting cancer from being exposed to small amounts of radiation is low. It's not a reason to avoid these tests for most people. What are the benefits of screening? Your scan may be normal (negative). For some people who are at higher risk, screening lowers the chance of dying of lung cancer. How much and how long you smoked helps to determine your risk level. Screening can find some cancers early, when treatment may be more likely to work.   What happens after screening? The results of your CT scan will be sent to your doctor. Someone from your care team will explain the results of your scan and answer any questions you may have. If you need any follow-up, he or she will help you understand what to do next. After a lung cancer screening, you can go back to your usual activities right away. A lung cancer screening test can't tell if you have lung cancer. If your results are positive, your doctor can't tell whether an abnormal finding is a harmless nodule, cancer, or something else without doing more tests. What can you do to help prevent lung cancer? Some lung cancers can't be prevented. But if you smoke, quitting smoking is the best step you can take to prevent lung cancer. If you want to quit, your doctor can recommend medicines or other ways to help. Follow-up care is a key part of your treatment and safety. Be sure to make and go to all appointments, and call your doctor if you are having problems. It's also a good idea to know your test results and keep a list of the medicines you take. Where can you learn more? Go to http://www.clark.com/ and enter Q940 to learn more about \"Learning About Lung Cancer Screening. \"  Current as of: May 4, 2022               Content Version: 13.5  © 6109-3886 [employer]. Care instructions adapted under license by [facility] ([facility]). If you have questions about a medical condition or this instruction, always ask your healthcare professional. [place] [address] any warranty or liability for your use of this information.

## 2023-03-28 ENCOUNTER — HOSPITAL ENCOUNTER (OUTPATIENT)
Dept: SLEEP CENTER | Age: 63
Discharge: HOME | End: 2023-03-31
Payer: OTHER GOVERNMENT

## 2023-03-28 PROCEDURE — 95806 SLEEP STUDY UNATT&RESP EFFT: CPT

## 2023-04-10 DIAGNOSIS — E78.2 MIXED HYPERLIPIDEMIA: ICD-10-CM

## 2023-04-10 DIAGNOSIS — E03.9 ACQUIRED HYPOTHYROIDISM: ICD-10-CM

## 2023-04-10 DIAGNOSIS — R00.2 PALPITATIONS: ICD-10-CM

## 2023-04-10 DIAGNOSIS — R94.31 ABNORMAL ECG: ICD-10-CM

## 2023-04-11 LAB
ALBUMIN SERPL-MCNC: 4.2 G/DL (ref 3.2–4.6)
ALBUMIN/GLOB SERPL: 1.2 (ref 0.4–1.6)
ALP SERPL-CCNC: 64 U/L (ref 50–136)
ALT SERPL-CCNC: 23 U/L (ref 12–65)
ANION GAP SERPL CALC-SCNC: 5 MMOL/L (ref 2–11)
AST SERPL-CCNC: 21 U/L (ref 15–37)
BILIRUB SERPL-MCNC: 0.3 MG/DL (ref 0.2–1.1)
BUN SERPL-MCNC: 14 MG/DL (ref 8–23)
CALCIUM SERPL-MCNC: 9.4 MG/DL (ref 8.3–10.4)
CHLORIDE SERPL-SCNC: 109 MMOL/L (ref 101–110)
CHOLEST SERPL-MCNC: 123 MG/DL
CO2 SERPL-SCNC: 26 MMOL/L (ref 21–32)
CREAT SERPL-MCNC: 0.7 MG/DL (ref 0.6–1)
GLOBULIN SER CALC-MCNC: 3.5 G/DL (ref 2.8–4.5)
GLUCOSE SERPL-MCNC: 86 MG/DL (ref 65–100)
HDLC SERPL-MCNC: 64 MG/DL (ref 40–60)
HDLC SERPL: 1.9
LDLC SERPL CALC-MCNC: 1.8 MG/DL
MAGNESIUM SERPL-MCNC: 2.3 MG/DL (ref 1.8–2.4)
POTASSIUM SERPL-SCNC: 4.4 MMOL/L (ref 3.5–5.1)
PROT SERPL-MCNC: 7.7 G/DL (ref 6.3–8.2)
SODIUM SERPL-SCNC: 140 MMOL/L (ref 133–143)
TRIGL SERPL-MCNC: 286 MG/DL (ref 35–150)
TSH, 3RD GENERATION: 7.01 UIU/ML (ref 0.36–3.74)
VLDLC SERPL CALC-MCNC: 57.2 MG/DL (ref 6–23)

## 2023-04-19 ENCOUNTER — SCHEDULED TELEPHONE ENCOUNTER (OUTPATIENT)
Dept: PULMONOLOGY | Age: 63
End: 2023-04-19
Payer: OTHER GOVERNMENT

## 2023-04-19 DIAGNOSIS — R06.83 SNORING: Primary | ICD-10-CM

## 2023-04-19 PROCEDURE — G2012 BRIEF CHECK IN BY MD/QHP: HCPCS | Performed by: NURSE PRACTITIONER

## 2023-04-19 NOTE — PROGRESS NOTES
[facility]  Telephone check-in-visit     4/19/2023      Received verbal permission from patient to perform this check-in-visit. Patient has not had related medical visit within the previous 7 days. Patient had called earlier this week to discuss her HST results. HST is reviewed. Had AHI of 3.4 events/hour with lowest desat of 88%. There was near continuously snoring throughout the study. Patient states that she had the worst night sleep in a long time on the night of her HST. Felt terrible the next day. Still has DHS and extreme fatigue. Discussed that home sleep testing cannot diagnose all sleep disorders. Recommend follow up in the sleep clinic for her snoring and advised to avoid driving and operating heavy machinery if sleepy. May need in lab study, but will defer that to sleep clinic. The patient does not require a visit unless there is worsening symptoms/fails to improve.     Orders Placed This Encounter   Procedures    Ambulatory referral to Sleep Medicine     Referral Priority:   Routine     Referral Type:   Consult for Advice and Opinion     Referral Reason:   Specialty Services Required     Requested Specialty:   Sleep Medicine Family Practice     Number of Visits Requested:   1        Total time:  6 minutes    Stephanie Cortez APRN - CNP

## 2023-04-27 ENCOUNTER — PATIENT MESSAGE (OUTPATIENT)
Dept: INTERNAL MEDICINE CLINIC | Facility: CLINIC | Age: 63
End: 2023-04-27

## 2023-04-27 RX ORDER — ERGOCALCIFEROL 1.25 MG/1
50000 CAPSULE ORAL
Qty: 4 CAPSULE | Refills: 2 | Status: SHIPPED | OUTPATIENT
Start: 2023-04-27

## 2023-04-27 NOTE — TELEPHONE ENCOUNTER
From: Dae Calvo  To: Dr. Zulema Almonte: 4/27/2023 6:45 AM EDT  Subject: refill     could you plz send in a prescription for the vitamin D3. ..  thank you

## 2023-05-04 ENCOUNTER — TELEPHONE (OUTPATIENT)
Dept: CARDIOLOGY CLINIC | Age: 63
End: 2023-05-04

## 2023-05-04 NOTE — TELEPHONE ENCOUNTER
Per Dr. Nicol Victor, \"Sinus rhythm throughout with no atrial fibrillation or atrial flutter benign PACs and PVCs, short burst of AT/NSVT, all clinically and pathologically benign. Continue meds and keep routine follow-up\". Pt informed of Dr. Nicol Victor' response. Pt thanked and voiced understanding of results.

## 2023-06-08 DIAGNOSIS — R09.82 POST-NASAL DRIP: ICD-10-CM

## 2023-06-11 RX ORDER — FLUTICASONE PROPIONATE 50 MCG
2 SPRAY, SUSPENSION (ML) NASAL DAILY
Qty: 3 EACH | Refills: 1 | Status: SHIPPED | OUTPATIENT
Start: 2023-06-11

## 2023-07-07 DIAGNOSIS — Z11.4 SCREENING FOR HUMAN IMMUNODEFICIENCY VIRUS: ICD-10-CM

## 2023-07-07 DIAGNOSIS — E78.00 PURE HYPERCHOLESTEROLEMIA: ICD-10-CM

## 2023-07-07 DIAGNOSIS — Z11.59 NEED FOR HEPATITIS C SCREENING TEST: ICD-10-CM

## 2023-07-07 DIAGNOSIS — E55.9 VITAMIN D DEFICIENCY: ICD-10-CM

## 2023-07-07 DIAGNOSIS — R73.01 IMPAIRED FASTING BLOOD SUGAR: ICD-10-CM

## 2023-07-07 DIAGNOSIS — E03.9 ACQUIRED HYPOTHYROIDISM: ICD-10-CM

## 2023-07-07 LAB
25(OH)D3 SERPL-MCNC: 64.6 NG/ML (ref 30–100)
ALBUMIN SERPL-MCNC: 4.2 G/DL (ref 3.2–4.6)
ALBUMIN/GLOB SERPL: 1 (ref 0.4–1.6)
ALP SERPL-CCNC: 65 U/L (ref 50–136)
ALT SERPL-CCNC: 22 U/L (ref 12–65)
ANION GAP SERPL CALC-SCNC: 8 MMOL/L (ref 2–11)
AST SERPL-CCNC: 66 U/L (ref 15–37)
BASOPHILS # BLD: 0.1 K/UL (ref 0–0.2)
BASOPHILS NFR BLD: 1 % (ref 0–2)
BILIRUB SERPL-MCNC: 0.4 MG/DL (ref 0.2–1.1)
BUN SERPL-MCNC: 11 MG/DL (ref 8–23)
CALCIUM SERPL-MCNC: 10.1 MG/DL (ref 8.3–10.4)
CHLORIDE SERPL-SCNC: 106 MMOL/L (ref 101–110)
CHOLEST SERPL-MCNC: 125 MG/DL
CO2 SERPL-SCNC: 24 MMOL/L (ref 21–32)
CREAT SERPL-MCNC: 0.8 MG/DL (ref 0.6–1)
DIFFERENTIAL METHOD BLD: ABNORMAL
EOSINOPHIL # BLD: 0.2 K/UL (ref 0–0.8)
EOSINOPHIL NFR BLD: 2 % (ref 0.5–7.8)
ERYTHROCYTE [DISTWIDTH] IN BLOOD BY AUTOMATED COUNT: 12.8 % (ref 11.9–14.6)
GLOBULIN SER CALC-MCNC: 4.3 G/DL (ref 2.8–4.5)
GLUCOSE SERPL-MCNC: 104 MG/DL (ref 65–100)
HCT VFR BLD AUTO: 42.9 % (ref 35.8–46.3)
HCV AB SER QL: REACTIVE
HDLC SERPL-MCNC: 60 MG/DL (ref 40–60)
HDLC SERPL: 2.1
HGB BLD-MCNC: 14.4 G/DL (ref 11.7–15.4)
HIV 1+2 AB+HIV1 P24 AG SERPL QL IA: NONREACTIVE
HIV 1/2 RESULT COMMENT: NORMAL
IMM GRANULOCYTES # BLD AUTO: 0 K/UL (ref 0–0.5)
IMM GRANULOCYTES NFR BLD AUTO: 0 % (ref 0–5)
LDLC SERPL CALC-MCNC: 32.2 MG/DL
LYMPHOCYTES # BLD: 2.4 K/UL (ref 0.5–4.6)
LYMPHOCYTES NFR BLD: 28 % (ref 13–44)
MCH RBC QN AUTO: 34.6 PG (ref 26.1–32.9)
MCHC RBC AUTO-ENTMCNC: 33.6 G/DL (ref 31.4–35)
MCV RBC AUTO: 103.1 FL (ref 82–102)
MONOCYTES # BLD: 0.4 K/UL (ref 0.1–1.3)
MONOCYTES NFR BLD: 5 % (ref 4–12)
NEUTS SEG # BLD: 5.3 K/UL (ref 1.7–8.2)
NEUTS SEG NFR BLD: 64 % (ref 43–78)
NRBC # BLD: 0 K/UL (ref 0–0.2)
PLATELET # BLD AUTO: 289 K/UL (ref 150–450)
PMV BLD AUTO: 10.5 FL (ref 9.4–12.3)
POTASSIUM SERPL-SCNC: 4.1 MMOL/L (ref 3.5–5.1)
PROT SERPL-MCNC: 8.5 G/DL (ref 6.3–8.2)
RBC # BLD AUTO: 4.16 M/UL (ref 4.05–5.2)
SODIUM SERPL-SCNC: 138 MMOL/L (ref 133–143)
TRIGL SERPL-MCNC: 164 MG/DL (ref 35–150)
TSH, 3RD GENERATION: 5.01 UIU/ML (ref 0.36–3.74)
VLDLC SERPL CALC-MCNC: 32.8 MG/DL (ref 6–23)
WBC # BLD AUTO: 8.3 K/UL (ref 4.3–11.1)

## 2023-07-08 LAB
EST. AVERAGE GLUCOSE BLD GHB EST-MCNC: 105 MG/DL
HBA1C MFR BLD: 5.3 % (ref 4.8–5.6)

## 2023-07-10 ENCOUNTER — PATIENT MESSAGE (OUTPATIENT)
Dept: INTERNAL MEDICINE CLINIC | Facility: CLINIC | Age: 63
End: 2023-07-10

## 2023-07-10 ENCOUNTER — TELEPHONE (OUTPATIENT)
Dept: INTERNAL MEDICINE CLINIC | Facility: CLINIC | Age: 63
End: 2023-07-10

## 2023-07-10 DIAGNOSIS — R76.8 HEPATITIS C ANTIBODY POSITIVE IN BLOOD: Primary | ICD-10-CM

## 2023-07-10 DIAGNOSIS — B19.20 HEPATITIS C VIRUS INFECTION WITHOUT HEPATIC COMA, UNSPECIFIED CHRONICITY: Primary | ICD-10-CM

## 2023-07-10 NOTE — TELEPHONE ENCOUNTER
Has had Hep C in 2001, had 4 treatments, 2015 had Hervani treatments through the [facility]. Has HSV and takes Valtrex. Plan to do Hep C rna Quant. Consider GI referral. She will see me on her appt.

## 2023-07-10 NOTE — TELEPHONE ENCOUNTER
From: Claudette Jacobsen  To: Dr. Horace Sadler: 7/10/2023 4:27 AM EDT  Subject: hep c results     good morning   I know I have appt on Friday 14th, but after getting results on this test eun very anxious and concerned   could dr Simone braga give me a quick call to answer a question   thank u so much  Key Helms

## 2023-07-10 NOTE — PROGRESS NOTES
7/14/2023     Subjective:     Chief Complaint   Patient presents with    Annual Exam     With labs prior        HPI:     Health Maintenance      Last Physical 2021   Last PAP s/p HYS   Last Mammogram 5/2021   Last colonoscopy 10/2020 including EGD repeat in 5 years   Last bone density DUE   Last eye exam 2023   Last dental exam 2023   Exercise active at home, doing weights at home   ACP no      Immunizations   Pneumonia - Prevnar no  Pneumovax no   Tdap due   TD no   Shingles Has had chickenpox as a child wants to wait on Shingrix   Flu vaccine no   COVID vaccine  9/2021  10/2021     Hypothyroidism  The patient is a 63 y.o. female who is seen for follow up of hypothyroidism. Current symptoms: none. Patient denies change in energy level, diarrhea, heat / cold intolerance, nervousness, palpitations, and weight changes. Symptoms are stable and well controlled. The patient is not on meds at this time. Most recent lab:   Lab Results   Component Value Date    TSH 6.580 (H) 01/20/2022       History of Hep C  Has been treated twice and doing well. Hep C quant test is negative. Interim History: non     Review of Systems   Constitutional:  Negative for appetite change, fatigue and unexpected weight change. HENT:  Negative for congestion, ear pain and sore throat. Eyes:  Negative for pain and visual disturbance. Respiratory:  Negative for shortness of breath and wheezing. Cardiovascular:  Negative for chest pain, palpitations and leg swelling. Gastrointestinal:  Negative for abdominal pain, blood in stool, constipation, diarrhea, nausea and vomiting. Endocrine: Negative for cold intolerance and heat intolerance. Genitourinary:  Negative for difficulty urinating, menstrual problem, pelvic pain, urgency and vaginal discharge. Musculoskeletal:  Negative for back pain and neck pain. Skin:  Negative for rash. Neurological:  Negative for dizziness, numbness and headaches.    Psychiatric/Behavioral:

## 2023-07-11 DIAGNOSIS — B19.20 HEPATITIS C VIRUS INFECTION WITHOUT HEPATIC COMA, UNSPECIFIED CHRONICITY: ICD-10-CM

## 2023-07-13 LAB
HCV GENTYP SERPL NAA+PROBE: NORMAL
HCV RNA SERPL NAA+PROBE-ACNC: NORMAL IU/ML
HCV RNA SERPL NAA+PROBE-LOG IU: NORMAL LOG10 IU/ML
LABORATORY COMMENT REPORT: NORMAL

## 2023-07-13 ASSESSMENT — PATIENT HEALTH QUESTIONNAIRE - PHQ9
1. LITTLE INTEREST OR PLEASURE IN DOING THINGS: 0
1. LITTLE INTEREST OR PLEASURE IN DOING THINGS: NOT AT ALL
SUM OF ALL RESPONSES TO PHQ QUESTIONS 1-9: 0
SUM OF ALL RESPONSES TO PHQ QUESTIONS 1-9: 0
2. FEELING DOWN, DEPRESSED OR HOPELESS: 0
SUM OF ALL RESPONSES TO PHQ QUESTIONS 1-9: 0
2. FEELING DOWN, DEPRESSED OR HOPELESS: NOT AT ALL
SUM OF ALL RESPONSES TO PHQ9 QUESTIONS 1 & 2: 0
SUM OF ALL RESPONSES TO PHQ9 QUESTIONS 1 & 2: 0
SUM OF ALL RESPONSES TO PHQ QUESTIONS 1-9: 0

## 2023-07-14 ENCOUNTER — OFFICE VISIT (OUTPATIENT)
Dept: INTERNAL MEDICINE CLINIC | Facility: CLINIC | Age: 63
End: 2023-07-14
Payer: OTHER GOVERNMENT

## 2023-07-14 VITALS
OXYGEN SATURATION: 96 % | DIASTOLIC BLOOD PRESSURE: 80 MMHG | BODY MASS INDEX: 20.89 KG/M2 | HEIGHT: 66 IN | SYSTOLIC BLOOD PRESSURE: 132 MMHG | HEART RATE: 94 BPM | WEIGHT: 130 LBS

## 2023-07-14 DIAGNOSIS — R91.1 LUNG NODULE: ICD-10-CM

## 2023-07-14 DIAGNOSIS — R73.01 IMPAIRED FASTING BLOOD SUGAR: ICD-10-CM

## 2023-07-14 DIAGNOSIS — Z00.00 ROUTINE GENERAL MEDICAL EXAMINATION AT A HEALTH CARE FACILITY: Primary | ICD-10-CM

## 2023-07-14 DIAGNOSIS — Z78.0 POSTMENOPAUSAL STATUS: ICD-10-CM

## 2023-07-14 DIAGNOSIS — E78.00 PURE HYPERCHOLESTEROLEMIA: ICD-10-CM

## 2023-07-14 DIAGNOSIS — L30.9 ECZEMA, UNSPECIFIED TYPE: ICD-10-CM

## 2023-07-14 DIAGNOSIS — Z12.31 ENCOUNTER FOR SCREENING MAMMOGRAM FOR MALIGNANT NEOPLASM OF BREAST: ICD-10-CM

## 2023-07-14 DIAGNOSIS — Z12.83 SKIN CANCER SCREENING: ICD-10-CM

## 2023-07-14 DIAGNOSIS — Z86.19 HISTORY OF HEPATITIS C: ICD-10-CM

## 2023-07-14 DIAGNOSIS — E03.9 ACQUIRED HYPOTHYROIDISM: ICD-10-CM

## 2023-07-14 DIAGNOSIS — E55.9 VITAMIN D DEFICIENCY: ICD-10-CM

## 2023-07-14 DIAGNOSIS — I10 PRIMARY HYPERTENSION: ICD-10-CM

## 2023-07-14 PROCEDURE — 3075F SYST BP GE 130 - 139MM HG: CPT | Performed by: FAMILY MEDICINE

## 2023-07-14 PROCEDURE — 3079F DIAST BP 80-89 MM HG: CPT | Performed by: FAMILY MEDICINE

## 2023-07-14 PROCEDURE — 99396 PREV VISIT EST AGE 40-64: CPT | Performed by: FAMILY MEDICINE

## 2023-07-14 RX ORDER — TRIAMCINOLONE ACETONIDE 1 MG/G
CREAM TOPICAL
Qty: 45 G | Refills: 0 | Status: SHIPPED | OUTPATIENT
Start: 2023-07-14

## 2023-07-14 SDOH — ECONOMIC STABILITY: HOUSING INSECURITY
IN THE LAST 12 MONTHS, WAS THERE A TIME WHEN YOU DID NOT HAVE A STEADY PLACE TO SLEEP OR SLEPT IN A SHELTER (INCLUDING NOW)?: NO

## 2023-07-14 SDOH — ECONOMIC STABILITY: INCOME INSECURITY: HOW HARD IS IT FOR YOU TO PAY FOR THE VERY BASICS LIKE FOOD, HOUSING, MEDICAL CARE, AND HEATING?: NOT HARD AT ALL

## 2023-07-14 SDOH — ECONOMIC STABILITY: FOOD INSECURITY: WITHIN THE PAST 12 MONTHS, THE FOOD YOU BOUGHT JUST DIDN'T LAST AND YOU DIDN'T HAVE MONEY TO GET MORE.: NEVER TRUE

## 2023-07-14 SDOH — ECONOMIC STABILITY: FOOD INSECURITY: WITHIN THE PAST 12 MONTHS, YOU WORRIED THAT YOUR FOOD WOULD RUN OUT BEFORE YOU GOT MONEY TO BUY MORE.: NEVER TRUE

## 2023-07-14 ASSESSMENT — ENCOUNTER SYMPTOMS
EYE PAIN: 0
BACK PAIN: 0
WHEEZING: 0
BLOOD IN STOOL: 0
VOMITING: 0
DIARRHEA: 0
ABDOMINAL PAIN: 0
CONSTIPATION: 0
NAUSEA: 0
SHORTNESS OF BREATH: 0
SORE THROAT: 0

## 2023-07-20 RX ORDER — ERGOCALCIFEROL 1.25 MG/1
50000 CAPSULE ORAL
Qty: 4 CAPSULE | Refills: 2 | Status: SHIPPED | OUTPATIENT
Start: 2023-07-20

## 2023-07-20 RX ORDER — VALACYCLOVIR HYDROCHLORIDE 500 MG/1
500 TABLET, FILM COATED ORAL 2 TIMES DAILY
Qty: 180 TABLET | Refills: 0 | Status: CANCELLED | OUTPATIENT
Start: 2023-07-20

## 2023-07-20 RX ORDER — VALACYCLOVIR HYDROCHLORIDE 500 MG/1
500 TABLET, FILM COATED ORAL 2 TIMES DAILY
Qty: 180 TABLET | Refills: 0 | Status: SHIPPED | OUTPATIENT
Start: 2023-07-20

## 2023-07-31 DIAGNOSIS — Z79.890 POSTMENOPAUSAL HRT (HORMONE REPLACEMENT THERAPY): ICD-10-CM

## 2023-07-31 RX ORDER — METOPROLOL SUCCINATE 25 MG/1
25 TABLET, EXTENDED RELEASE ORAL DAILY
Qty: 90 TABLET | Refills: 5 | Status: SHIPPED | OUTPATIENT
Start: 2023-07-31

## 2023-07-31 NOTE — PROGRESS NOTES
Collaborating Physician: Dr. Balaji Meneses    Over 50% of today's office visit was spent in face to face time reviewing test results, prognosis, importance of compliance, education about disease process, benefits of medications, instructions for management of acute flare-ups, and follow up plans. Total face to face time spent with patient was 34 minutes.         ESSIE Lui  Electronically signed

## 2023-08-01 ENCOUNTER — OFFICE VISIT (OUTPATIENT)
Dept: SLEEP MEDICINE | Age: 63
End: 2023-08-01
Payer: OTHER GOVERNMENT

## 2023-08-01 VITALS
SYSTOLIC BLOOD PRESSURE: 122 MMHG | HEART RATE: 74 BPM | HEIGHT: 66 IN | OXYGEN SATURATION: 97 % | DIASTOLIC BLOOD PRESSURE: 82 MMHG | RESPIRATION RATE: 14 BRPM | BODY MASS INDEX: 20.98 KG/M2

## 2023-08-01 DIAGNOSIS — G47.10 HYPERSOMNIA: ICD-10-CM

## 2023-08-01 DIAGNOSIS — R06.83 SNORING: ICD-10-CM

## 2023-08-01 DIAGNOSIS — G47.00 INSOMNIA, UNSPECIFIED TYPE: Primary | ICD-10-CM

## 2023-08-01 PROCEDURE — 3079F DIAST BP 80-89 MM HG: CPT | Performed by: PHYSICIAN ASSISTANT

## 2023-08-01 PROCEDURE — 3074F SYST BP LT 130 MM HG: CPT | Performed by: PHYSICIAN ASSISTANT

## 2023-08-01 PROCEDURE — 99214 OFFICE O/P EST MOD 30 MIN: CPT | Performed by: PHYSICIAN ASSISTANT

## 2023-08-01 RX ORDER — ESZOPICLONE 2 MG/1
2 TABLET, FILM COATED ORAL NIGHTLY
Qty: 1 TABLET | Refills: 0 | Status: SHIPPED | OUTPATIENT
Start: 2023-08-01 | End: 2023-08-02

## 2023-08-01 ASSESSMENT — SLEEP AND FATIGUE QUESTIONNAIRES
HOW LIKELY ARE YOU TO NOD OFF OR FALL ASLEEP WHILE SITTING AND READING: 3
HOW LIKELY ARE YOU TO NOD OFF OR FALL ASLEEP WHILE SITTING INACTIVE IN A PUBLIC PLACE: 3
HOW LIKELY ARE YOU TO NOD OFF OR FALL ASLEEP WHILE SITTING QUIETLY AFTER LUNCH WITHOUT ALCOHOL: 2
HOW LIKELY ARE YOU TO NOD OFF OR FALL ASLEEP IN A CAR, WHILE STOPPED FOR A FEW MINUTES IN TRAFFIC: 1
ESS TOTAL SCORE: 17
HOW LIKELY ARE YOU TO NOD OFF OR FALL ASLEEP WHEN YOU ARE A PASSENGER IN A CAR FOR AN HOUR WITHOUT A BREAK: 3
HOW LIKELY ARE YOU TO NOD OFF OR FALL ASLEEP WHILE LYING DOWN TO REST IN THE AFTERNOON WHEN CIRCUMSTANCES PERMIT: 1
HOW LIKELY ARE YOU TO NOD OFF OR FALL ASLEEP WHILE WATCHING TV: 3
HOW LIKELY ARE YOU TO NOD OFF OR FALL ASLEEP WHILE SITTING AND TALKING TO SOMEONE: 1

## 2023-08-02 ENCOUNTER — HOSPITAL ENCOUNTER (OUTPATIENT)
Dept: MAMMOGRAPHY | Age: 63
Discharge: HOME | End: 2023-08-05
Attending: FAMILY MEDICINE
Payer: OTHER GOVERNMENT

## 2023-08-02 DIAGNOSIS — Z78.0 POSTMENOPAUSAL STATUS: ICD-10-CM

## 2023-08-02 DIAGNOSIS — Z12.31 ENCOUNTER FOR SCREENING MAMMOGRAM FOR MALIGNANT NEOPLASM OF BREAST: ICD-10-CM

## 2023-08-02 PROCEDURE — 77080 DXA BONE DENSITY AXIAL: CPT

## 2023-08-02 PROCEDURE — 77067 SCR MAMMO BI INCL CAD: CPT

## 2023-08-17 ENCOUNTER — HOSPITAL ENCOUNTER (OUTPATIENT)
Dept: SLEEP MEDICINE | Age: 63
Discharge: HOME | End: 2023-08-20
Payer: OTHER GOVERNMENT

## 2023-08-17 PROCEDURE — 95810 POLYSOM 6/> YRS 4/> PARAM: CPT

## 2023-08-17 RX ORDER — EVOLOCUMAB 140 MG/ML
140 INJECTION, SOLUTION SUBCUTANEOUS
OUTPATIENT
Start: 2023-08-17

## 2023-08-17 RX ORDER — EVOLOCUMAB 140 MG/ML
INJECTION, SOLUTION SUBCUTANEOUS
Qty: 6 ML | Refills: 3 | Status: SHIPPED | OUTPATIENT
Start: 2023-08-17

## 2023-08-28 ENCOUNTER — APPOINTMENT (RX ONLY)
Dept: URBAN - METROPOLITAN AREA CLINIC 329 | Facility: CLINIC | Age: 63
Setting detail: DERMATOLOGY
End: 2023-08-28

## 2023-08-28 DIAGNOSIS — L28.1 PRURIGO NODULARIS: ICD-10-CM | Status: INADEQUATELY CONTROLLED

## 2023-08-28 DIAGNOSIS — L82.1 OTHER SEBORRHEIC KERATOSIS: ICD-10-CM | Status: STABLE

## 2023-08-28 PROCEDURE — ? COUNSELING

## 2023-08-28 PROCEDURE — ? FULL BODY SKIN EXAM - DECLINED

## 2023-08-28 PROCEDURE — ? MDM - TREATMENT GOALS

## 2023-08-28 PROCEDURE — ? PRESCRIPTION

## 2023-08-28 PROCEDURE — ? PRESCRIPTION MEDICATION MANAGEMENT

## 2023-08-28 PROCEDURE — ? ADDITIONAL NOTES

## 2023-08-28 PROCEDURE — 99204 OFFICE O/P NEW MOD 45 MIN: CPT

## 2023-08-28 RX ORDER — FLUTICASONE PROPIONATE 0.05 MG/G
OINTMENT TOPICAL
Qty: 60 | Refills: 5 | Status: ERX | COMMUNITY
Start: 2023-08-28

## 2023-08-28 RX ADMIN — FLUTICASONE PROPIONATE: 0.05 OINTMENT TOPICAL at 00:00

## 2023-08-28 ASSESSMENT — LOCATION DETAILED DESCRIPTION DERM
LOCATION DETAILED: LEFT DISTAL PRETIBIAL REGION
LOCATION DETAILED: RIGHT DISTAL PRETIBIAL REGION
LOCATION DETAILED: LEFT PROXIMAL PRETIBIAL REGION
LOCATION DETAILED: RIGHT POSTERIOR NECK
LOCATION DETAILED: RIGHT INFERIOR LATERAL MALAR CHEEK
LOCATION DETAILED: LEFT PROXIMAL PRETIBIAL REGION
LOCATION DETAILED: LEFT PROXIMAL DORSAL FOREARM
LOCATION DETAILED: LEFT SUPERIOR LATERAL NECK
LOCATION DETAILED: LEFT DISTAL DORSAL FOREARM
LOCATION DETAILED: LEFT DISTAL PRETIBIAL REGION

## 2023-08-28 ASSESSMENT — LOCATION ZONE DERM
LOCATION ZONE: NECK
LOCATION ZONE: NECK
LOCATION ZONE: FACE
LOCATION ZONE: LEG
LOCATION ZONE: LEG
LOCATION ZONE: ARM

## 2023-08-28 ASSESSMENT — LOCATION SIMPLE DESCRIPTION DERM
LOCATION SIMPLE: RIGHT PRETIBIAL REGION
LOCATION SIMPLE: LEFT PRETIBIAL REGION
LOCATION SIMPLE: LEFT FOREARM
LOCATION SIMPLE: LEFT PRETIBIAL REGION
LOCATION SIMPLE: LEFT ANTERIOR NECK
LOCATION SIMPLE: POSTERIOR NECK
LOCATION SIMPLE: RIGHT CHEEK

## 2023-08-28 NOTE — PROCEDURE: ADDITIONAL NOTES
Render Risk Assessment In Note?: no
Detail Level: Simple
Additional Notes: May consider a biopsy if patient isn’t improving.

## 2023-08-28 NOTE — HPI: EVALUATION OF SKIN LESION(S)
Hpi Title: Evaluation of Skin Lesions
Have Your Spot(S) Been Treated In The Past?: has not been treated
Additional History: Patient states she has lesions on her arms that have been present for about six weeks. She was prescribed Triamcinolone.

## 2023-08-28 NOTE — PROCEDURE: PRESCRIPTION MEDICATION MANAGEMENT
Initiate Treatment: Fluticasone ointment twice daily for two weeks
Detail Level: Zone
Discontinue Regimen: Triamcinolone
Render In Strict Bullet Format?: No

## 2023-10-19 ENCOUNTER — APPOINTMENT (RX ONLY)
Dept: URBAN - METROPOLITAN AREA CLINIC 329 | Facility: CLINIC | Age: 63
Setting detail: DERMATOLOGY
End: 2023-10-19

## 2023-10-19 DIAGNOSIS — L28.1 PRURIGO NODULARIS: ICD-10-CM

## 2023-10-19 PROBLEM — L30.9 DERMATITIS, UNSPECIFIED: Status: ACTIVE | Noted: 2023-10-19

## 2023-10-19 PROCEDURE — 11104 PUNCH BX SKIN SINGLE LESION: CPT

## 2023-10-19 PROCEDURE — ? BIOPSY BY PUNCH METHOD

## 2023-10-19 PROCEDURE — ? COUNSELING

## 2023-10-19 PROCEDURE — ? FULL BODY SKIN EXAM - DECLINED

## 2023-10-19 PROCEDURE — ? MDM - TREATMENT GOALS

## 2023-10-19 ASSESSMENT — LOCATION DETAILED DESCRIPTION DERM
LOCATION DETAILED: RIGHT ANTERIOR PROXIMAL UPPER ARM
LOCATION DETAILED: LEFT DISTAL DORSAL FOREARM
LOCATION DETAILED: RIGHT POSTERIOR NECK

## 2023-10-19 ASSESSMENT — LOCATION SIMPLE DESCRIPTION DERM
LOCATION SIMPLE: RIGHT UPPER ARM
LOCATION SIMPLE: POSTERIOR NECK
LOCATION SIMPLE: LEFT FOREARM

## 2023-10-19 ASSESSMENT — LOCATION ZONE DERM
LOCATION ZONE: ARM
LOCATION ZONE: NECK

## 2023-10-30 ENCOUNTER — APPOINTMENT (RX ONLY)
Dept: URBAN - METROPOLITAN AREA CLINIC 329 | Facility: CLINIC | Age: 63
Setting detail: DERMATOLOGY
End: 2023-10-30

## 2023-10-30 DIAGNOSIS — Z48.02 ENCOUNTER FOR REMOVAL OF SUTURES: ICD-10-CM

## 2023-10-30 PROCEDURE — ? FULL BODY SKIN EXAM - DECLINED

## 2023-10-30 PROCEDURE — ? SUTURE REMOVAL

## 2023-10-30 PROCEDURE — ? COUNSELING

## 2023-10-30 ASSESSMENT — LOCATION SIMPLE DESCRIPTION DERM: LOCATION SIMPLE: LEFT POSTERIOR UPPER ARM

## 2023-10-30 ASSESSMENT — LOCATION ZONE DERM: LOCATION ZONE: ARM

## 2023-10-30 ASSESSMENT — LOCATION DETAILED DESCRIPTION DERM: LOCATION DETAILED: LEFT DISTAL POSTERIOR UPPER ARM

## 2023-11-29 ASSESSMENT — ENCOUNTER SYMPTOMS
SORE THROAT: 0
PHOTOPHOBIA: 0
CONSTIPATION: 0
SHORTNESS OF BREATH: 0
DIARRHEA: 0
ABDOMINAL PAIN: 0
ABDOMINAL DISTENTION: 0

## 2023-11-29 NOTE — PROGRESS NOTES
atrial contraction)- improved and resolved for the most part, negative Holter. Continue Toprol-XL. Stay hydrated but avoid caffeine, alcohol, nicotine    Gastroesophageal reflux disease without esophagitis- PPI as needed    Acquired hypothyroidism-more palpitations. Check TSH  surveillance per PCP    Mixed hyperlipidemia-excellent, compliant with Repatha every 2 weeks since last summer. Recheck lipid and liver per routine. Continue healthy diet and lifestyle. Return in about 1 year (around 11/30/2024).          Max Charles MD  11/30/2023  4:10 PM

## 2023-11-30 ENCOUNTER — OFFICE VISIT (OUTPATIENT)
Age: 63
End: 2023-11-30
Payer: OTHER GOVERNMENT

## 2023-11-30 VITALS
DIASTOLIC BLOOD PRESSURE: 76 MMHG | HEART RATE: 83 BPM | SYSTOLIC BLOOD PRESSURE: 124 MMHG | WEIGHT: 130.4 LBS | HEIGHT: 66 IN | BODY MASS INDEX: 20.96 KG/M2

## 2023-11-30 DIAGNOSIS — K21.9 GASTROESOPHAGEAL REFLUX DISEASE WITHOUT ESOPHAGITIS: ICD-10-CM

## 2023-11-30 DIAGNOSIS — R93.1 AGATSTON CORONARY ARTERY CALCIUM SCORE LESS THAN 100: Primary | ICD-10-CM

## 2023-11-30 DIAGNOSIS — E78.2 MIXED HYPERLIPIDEMIA: ICD-10-CM

## 2023-11-30 DIAGNOSIS — I49.1 PAC (PREMATURE ATRIAL CONTRACTION): ICD-10-CM

## 2023-11-30 DIAGNOSIS — I10 PRIMARY HYPERTENSION: ICD-10-CM

## 2023-11-30 PROCEDURE — 93000 ELECTROCARDIOGRAM COMPLETE: CPT | Performed by: INTERNAL MEDICINE

## 2023-11-30 PROCEDURE — 3074F SYST BP LT 130 MM HG: CPT | Performed by: INTERNAL MEDICINE

## 2023-11-30 PROCEDURE — 99214 OFFICE O/P EST MOD 30 MIN: CPT | Performed by: INTERNAL MEDICINE

## 2023-11-30 PROCEDURE — 3078F DIAST BP <80 MM HG: CPT | Performed by: INTERNAL MEDICINE

## 2023-11-30 ASSESSMENT — ENCOUNTER SYMPTOMS: COUGH: 1

## 2023-12-02 ENCOUNTER — HOSPITAL ENCOUNTER (EMERGENCY)
Age: 63
Discharge: HOME | End: 2023-12-02
Attending: EMERGENCY MEDICINE
Payer: OTHER GOVERNMENT

## 2023-12-02 ENCOUNTER — APPOINTMENT (OUTPATIENT)
Dept: GENERAL RADIOLOGY | Age: 63
End: 2023-12-02
Payer: OTHER GOVERNMENT

## 2023-12-02 VITALS
HEIGHT: 66 IN | BODY MASS INDEX: 20.89 KG/M2 | RESPIRATION RATE: 18 BRPM | WEIGHT: 130 LBS | OXYGEN SATURATION: 97 % | HEART RATE: 96 BPM | TEMPERATURE: 98.3 F | SYSTOLIC BLOOD PRESSURE: 126 MMHG | DIASTOLIC BLOOD PRESSURE: 83 MMHG

## 2023-12-02 DIAGNOSIS — J20.9 ACUTE BRONCHITIS, UNSPECIFIED ORGANISM: Primary | ICD-10-CM

## 2023-12-02 LAB
FLUAV RNA SPEC QL NAA+PROBE: NOT DETECTED
FLUBV RNA SPEC QL NAA+PROBE: NOT DETECTED
SARS-COV-2 RDRP RESP QL NAA+PROBE: NOT DETECTED
SOURCE: NORMAL

## 2023-12-02 PROCEDURE — 87635 SARS-COV-2 COVID-19 AMP PRB: CPT

## 2023-12-02 PROCEDURE — 87502 INFLUENZA DNA AMP PROBE: CPT

## 2023-12-02 PROCEDURE — 71046 X-RAY EXAM CHEST 2 VIEWS: CPT

## 2023-12-02 PROCEDURE — 99284 EMERGENCY DEPT VISIT MOD MDM: CPT

## 2023-12-02 RX ORDER — ALBUTEROL SULFATE 90 UG/1
2 AEROSOL, METERED RESPIRATORY (INHALATION) 4 TIMES DAILY PRN
Qty: 54 G | Refills: 1 | Status: SHIPPED | OUTPATIENT
Start: 2023-12-02

## 2023-12-02 RX ORDER — LEVOFLOXACIN 500 MG/1
500 TABLET, FILM COATED ORAL DAILY
Qty: 10 TABLET | Refills: 0 | Status: SHIPPED | OUTPATIENT
Start: 2023-12-02 | End: 2023-12-12

## 2023-12-02 RX ORDER — HYDROCODONE BITARTRATE AND HOMATROPINE METHYLBROMIDE ORAL SOLUTION 5; 1.5 MG/5ML; MG/5ML
2.5 LIQUID ORAL 4 TIMES DAILY PRN
Qty: 120 ML | Refills: 0 | Status: SHIPPED | OUTPATIENT
Start: 2023-12-02 | End: 2023-12-05

## 2023-12-02 ASSESSMENT — ENCOUNTER SYMPTOMS
VOICE CHANGE: 0
EYE PAIN: 0
EYE REDNESS: 0
BACK PAIN: 0
SORE THROAT: 0
COUGH: 1
VOMITING: 0
CHEST TIGHTNESS: 1
NAUSEA: 0
ABDOMINAL PAIN: 0
COLOR CHANGE: 0
STRIDOR: 0

## 2023-12-02 ASSESSMENT — LIFESTYLE VARIABLES
HOW OFTEN DO YOU HAVE A DRINK CONTAINING ALCOHOL: NEVER
HOW MANY STANDARD DRINKS CONTAINING ALCOHOL DO YOU HAVE ON A TYPICAL DAY: PATIENT DOES NOT DRINK

## 2023-12-02 ASSESSMENT — PAIN SCALES - GENERAL
PAINLEVEL_OUTOF10: 0
PAINLEVEL_OUTOF10: 0

## 2023-12-02 ASSESSMENT — PAIN - FUNCTIONAL ASSESSMENT: PAIN_FUNCTIONAL_ASSESSMENT: NONE - DENIES PAIN

## 2023-12-02 NOTE — DISCHARGE INSTRUCTIONS
Take medications as prescribed including albuterol  Follow-up with your primary care physician  Return to the ER for any new, worsening or life-threatening symptoms

## 2023-12-02 NOTE — ED TRIAGE NOTES
Ambulatory to triage. Patient states that she has previously been dx with pneumonia 3 weeks ago and placed on a course of doxycycline and some steroids. Patient states that her cough has not improved. Patient states that the cough is making it difficult to sleep and that the sometime induces emesis. Otherwise patient denies NVD, fevers.

## 2023-12-02 NOTE — ED PROVIDER NOTES
Emergency Department Provider Note       PCP: Gabrielle Cole MD   Age: 66 y.o. Sex: female     DISPOSITION Decision To Discharge 12/02/2023 08:40:52 AM       ICD-10-CM    1. Acute bronchitis, unspecified organism  J20.9 HYDROcodone homatropine (HYCODAN) 5-1.5 MG/5ML solution          Medical Decision Making     Complexity of Problems Addressed:  1 or more chronic illnesses with a severe exacerbation or progression. 1 or more acute illnesses that pose a threat to life or bodily function. Data Reviewed and Analyzed:  I independently ordered and reviewed each unique test.  I reviewed external records: ED visit note from an outside group. I reviewed external records: provider visit note from PCP. I reviewed external records: provider visit note from outside specialist.  I reviewed external records: previous lab results from outside ED. I reviewed external records: previous imaging study including radiologist interpretation. I independently interpreted the cardiac monitor rhythm strip normal sinus rhythm. I interpreted the X-rays no obvious pneumonia. .    Discussion of management or test interpretation. Patient noted to have some wheezing here on exam but is not hypoxic. Will obtain chest x-ray, swab for COVID and the flu.    8:42 AM EST  Negative for COVID and the flu. Will treat with albuterol, and antibiotics for bronchitis. Encourage PCP follow-up. Well-appearing here. Risk of Complications and/or Morbidity of Patient Management:  Prescription drug management performed. Shared medical decision making was utilized in creating the patients health plan today. History       Patient presents the ER with complaints of persistent cough. Patient states symptoms started a couple weeks ago. Reports she completed a course of antibiotics as well as steroids. States cough is return. States she is in intermittently develop some fevers and chills. He is a former smoker.     The

## 2023-12-15 ENCOUNTER — TELEPHONE (OUTPATIENT)
Dept: PULMONOLOGY | Age: 63
End: 2023-12-15

## 2023-12-15 ENCOUNTER — APPOINTMENT (RX ONLY)
Dept: URBAN - METROPOLITAN AREA CLINIC 329 | Facility: CLINIC | Age: 63
Setting detail: DERMATOLOGY
End: 2023-12-15

## 2023-12-15 DIAGNOSIS — L57.8 OTHER SKIN CHANGES DUE TO CHRONIC EXPOSURE TO NONIONIZING RADIATION: ICD-10-CM | Status: STABLE

## 2023-12-15 DIAGNOSIS — L82.0 INFLAMED SEBORRHEIC KERATOSIS: ICD-10-CM | Status: INADEQUATELY CONTROLLED

## 2023-12-15 DIAGNOSIS — L57.0 ACTINIC KERATOSIS: ICD-10-CM | Status: INADEQUATELY CONTROLLED

## 2023-12-15 PROCEDURE — ? ADDITIONAL NOTES

## 2023-12-15 PROCEDURE — 17000 DESTRUCT PREMALG LESION: CPT | Mod: 59

## 2023-12-15 PROCEDURE — ? TREATMENT REGIMEN

## 2023-12-15 PROCEDURE — 99213 OFFICE O/P EST LOW 20 MIN: CPT | Mod: 25

## 2023-12-15 PROCEDURE — 17110 DESTRUCTION B9 LES UP TO 14: CPT

## 2023-12-15 PROCEDURE — ? FULL BODY SKIN EXAM - DECLINED

## 2023-12-15 PROCEDURE — 17003 DESTRUCT PREMALG LES 2-14: CPT | Mod: 59

## 2023-12-15 PROCEDURE — ? COUNSELING

## 2023-12-15 PROCEDURE — ? LIQUID NITROGEN

## 2023-12-15 ASSESSMENT — LOCATION SIMPLE DESCRIPTION DERM
LOCATION SIMPLE: LEFT POSTERIOR UPPER ARM
LOCATION SIMPLE: RIGHT PRETIBIAL REGION
LOCATION SIMPLE: RIGHT FOREARM
LOCATION SIMPLE: RIGHT UPPER ARM
LOCATION SIMPLE: RIGHT WRIST
LOCATION SIMPLE: LEFT FOREARM
LOCATION SIMPLE: POSTERIOR NECK
LOCATION SIMPLE: RIGHT NOSE
LOCATION SIMPLE: LEFT UPPER ARM

## 2023-12-15 ASSESSMENT — LOCATION DETAILED DESCRIPTION DERM
LOCATION DETAILED: LEFT ANTERIOR DISTAL UPPER ARM
LOCATION DETAILED: RIGHT ANTERIOR PROXIMAL UPPER ARM
LOCATION DETAILED: LEFT SUPERIOR POSTERIOR NECK
LOCATION DETAILED: LEFT PROXIMAL DORSAL FOREARM
LOCATION DETAILED: RIGHT NASAL SIDEWALL
LOCATION DETAILED: RIGHT PROXIMAL DORSAL FOREARM
LOCATION DETAILED: RIGHT DORSAL WRIST
LOCATION DETAILED: RIGHT PROXIMAL PRETIBIAL REGION
LOCATION DETAILED: LEFT DISTAL DORSAL FOREARM
LOCATION DETAILED: LEFT PROXIMAL POSTERIOR UPPER ARM
LOCATION DETAILED: RIGHT DISTAL DORSAL FOREARM
LOCATION DETAILED: LEFT ANTERIOR PROXIMAL UPPER ARM
LOCATION DETAILED: RIGHT DISTAL PRETIBIAL REGION

## 2023-12-15 ASSESSMENT — LOCATION ZONE DERM
LOCATION ZONE: NECK
LOCATION ZONE: ARM
LOCATION ZONE: LEG
LOCATION ZONE: NOSE

## 2023-12-15 NOTE — HPI: SKIN LESION
How Severe Is Your Skin Lesion?: mild
Is This A New Presentation, Or A Follow-Up?: Skin Lesion
Additional History: Patient has a spot on her neck that she wants removed and several lesions she wants treated.

## 2023-12-15 NOTE — PROCEDURE: ADDITIONAL NOTES
Detail Level: Simple
Additional Notes: Lesion on patients left upper arm was a biopsy proven AK. lesion was treated with LN2 today.
Render Risk Assessment In Note?: no

## 2023-12-15 NOTE — TELEPHONE ENCOUNTER
TRIAGE CALL      Complaint: Coughing/SOB  Cough: yes  Productive:  yes  Bloody Sputum:  no  Increased SOB/Wheezing:  yes  Duration: 1 months  Fever/Chills: yes on /off fever  OTC Meds tried: mucinex  Went to [facility]

## 2023-12-15 NOTE — TELEPHONE ENCOUNTER
LOV 4/19/23 via VV with Ernie Langston NP-- emphysema, former smoker, pulm nodules    Pt calling to report that she is coughing and SOB. Having some sputum production. States that this has been ongoing for about a month with an on and off fever as well. Is taking Mucinex. Finished antibiotics x2 and steroids. She was seen in [facility] ER on 12/2 for cough s/p PNA dx 3 weeks prior and treated with steroids/doxycycline. Only seen for the cough and dx with bronchitis. COVID and flu neg. Treated at this point with Levaquin, albuterol, and given hycodan for cough. Reviewed with pt that PNA does take time to heal and that she may have lingering cough for some time. Reviewed that she can take OTC medications to help along with her PRN albuterol. Rest and hydration recommended. Reminded pt that she is due for LDCT. Given number to schedule. Pt will schedule this CT for Jan. And then have f/up with NP. Explained that CT will also let us know if her PNA is clearing. Verbalized understanding.

## 2023-12-15 NOTE — PROCEDURE: LIQUID NITROGEN
Render Note In Bullet Format When Appropriate: No
Post-Care Instructions: I reviewed with the patient in detail post-care instructions. Patient is to wear sunprotection, and avoid picking at any of the treated lesions. Pt may apply Vaseline to crusted or scabbing areas.
Show Applicator Variable?: Yes
Duration Of Freeze Thaw-Cycle (Seconds): 0
Number Of Freeze-Thaw Cycles: 2 freeze-thaw cycles
Consent: The patient's consent was obtained including but not limited to risks of crusting, scabbing, blistering, scarring, darker or lighter pigmentary change, recurrence, incomplete removal and infection.
Detail Level: Detailed
Spray Paint Text: The liquid nitrogen was applied to the skin utilizing a spray paint frosting technique.
Medical Necessity Clause: This procedure was medically necessary because the lesions that were treated were:
Medical Necessity Information: It is in your best interest to select a reason for this procedure from the list below. All of these items fulfill various CMS LCD requirements except the new and changing color options.

## 2024-01-25 ENCOUNTER — HOSPITAL ENCOUNTER (OUTPATIENT)
Dept: CT IMAGING | Age: 64
Discharge: HOME | End: 2024-01-27
Payer: OTHER GOVERNMENT

## 2024-01-25 DIAGNOSIS — Z87.891 PERSONAL HISTORY OF TOBACCO USE: ICD-10-CM

## 2024-01-25 PROCEDURE — 71271 CT THORAX LUNG CANCER SCR C-: CPT

## 2024-01-25 NOTE — PROGRESS NOTES
Name:  Agustina Victoria  YOB: 1960   MRN: [id number]      Office Visit: 1/29/2024        ASSESSMENT AND PLAN:  (Medical Decision Making)    Impression: 63 y.o. female     1. Centrilobular emphysema (HCC)  --remains on albuterol only.    2. Personal history of tobacco use  --reviewed recent CT of chest with her--no worrisome findings for lung cancer-mild scarring in right base.  --Discussed with patient current guidelines for screening for lung cancer.  Current recommendations are to obtain yearly screening LDCT yearly from age 50-80, or until smoke free for 15 years.  Patient has 20 pack year history of cigarette smoking and currently smoke free since 2022.  Discussed with patient risks and benefits of screening, including over-diagnosis, false positive rate, and total radiation exposure.  Patient currently exhibits no signs or symptoms suggestive of lung cancer.  Discussed with patient importance of compliance with yearly annual lung cancer screenings and willingness to undergo diagnosis and treatment if screening scan is positive.  In addition, patient was counseled regarding remaining smoke free.    - TX VISIT TO DISCUSS LUNG CA SCREEN W LDCT  - CT Lung Screen (Initial/Annual/Baseline); Future    3. Lung nodule  --resolved on CT scan.    No orders of the defined types were placed in this encounter.        Procedures    TX VISIT TO DISCUSS LUNG CA SCREEN W LDCT     Follow-up and Dispositions    Return in about 6 months (around 7/29/2024) for Lola, COPD, spirometry, Arrive 20 minutes prior to appt time,  office.     Collaborating physician is Dr. Jose Lora.    ADS    Lola Almonte, APRN - CNP    _________________________________________________________________________    HISTORY OF PRESENT ILLNESS:    Ms. Agustina Victoria is a 63 y.o. female who is seen at [facility] for  Emphysema     The patient is a 63-year-old female who is seen for follow-up of pneumonia.  She was seen

## 2024-01-29 ENCOUNTER — OFFICE VISIT (OUTPATIENT)
Dept: PULMONOLOGY | Age: 64
End: 2024-01-29
Payer: OTHER GOVERNMENT

## 2024-01-29 VITALS
TEMPERATURE: 97.6 F | WEIGHT: 123.4 LBS | HEART RATE: 99 BPM | DIASTOLIC BLOOD PRESSURE: 78 MMHG | SYSTOLIC BLOOD PRESSURE: 132 MMHG | HEIGHT: 66 IN | RESPIRATION RATE: 18 BRPM | OXYGEN SATURATION: 99 % | BODY MASS INDEX: 19.83 KG/M2

## 2024-01-29 DIAGNOSIS — J43.2 CENTRILOBULAR EMPHYSEMA (HCC): Primary | ICD-10-CM

## 2024-01-29 DIAGNOSIS — R91.1 LUNG NODULE: ICD-10-CM

## 2024-01-29 DIAGNOSIS — Z87.891 PERSONAL HISTORY OF TOBACCO USE: ICD-10-CM

## 2024-01-29 PROCEDURE — 99214 OFFICE O/P EST MOD 30 MIN: CPT | Performed by: NURSE PRACTITIONER

## 2024-01-29 PROCEDURE — 3075F SYST BP GE 130 - 139MM HG: CPT | Performed by: NURSE PRACTITIONER

## 2024-01-29 PROCEDURE — 3078F DIAST BP <80 MM HG: CPT | Performed by: NURSE PRACTITIONER

## 2024-01-29 PROCEDURE — G0296 VISIT TO DETERM LDCT ELIG: HCPCS | Performed by: NURSE PRACTITIONER

## 2024-01-29 NOTE — PATIENT INSTRUCTIONS
risk. Others say it's people age 55 or older with a 30-pack-year history.  To see if you could benefit from screening, first find out if you are at high risk for lung cancer. Your doctor can help you decide your lung cancer risk.  What are the risks of screening?  CT screening for lung cancer isn't perfect. It can show an abnormal result when it turns out there wasn't any cancer. This is called a false-positive result. This means you may need more tests to make sure you don't have cancer. These tests can be harmful and cause a lot of worry.  These tests may include more CT scans and invasive testing like a lung biopsy. In a biopsy, the doctor takes a sample of tissue from inside your lung so it can be looked at under a microscope. A biopsy is the only way to tell if you have lung cancer. If the biopsy finds cancer, you and your doctor will have to decide how or whether to treat it.  Some lung cancers found on CT scans are harmless and would not have caused a problem if they had not been found through screening. But because doctors can't tell which ones will turn out to be harmless, most will be treated. This means that you may get treatment--including surgery, radiation, or chemotherapy--that you don't need.  There is a risk of damage to cells or tissue from being exposed to radiation, including the small amounts used in CTs, X-rays, and other medical tests. Over time, exposure to radiation may cause cancer and other health problems. But in most cases, the risk of getting cancer from being exposed to small amounts of radiation is low. It's not a reason to avoid these tests for most people.  What are the benefits of screening?  Your scan may be normal (negative).  For some people who are at higher risk, screening lowers the chance of dying of lung cancer. How much and how long you smoked helps to determine your risk level. Screening can find some cancers early, when treatment may be more likely to work.  What happens

## 2024-02-27 ASSESSMENT — PATIENT HEALTH QUESTIONNAIRE - PHQ9
2. FEELING DOWN, DEPRESSED OR HOPELESS: NOT AT ALL
SUM OF ALL RESPONSES TO PHQ QUESTIONS 1-9: 1
2. FEELING DOWN, DEPRESSED OR HOPELESS: 0
1. LITTLE INTEREST OR PLEASURE IN DOING THINGS: 1
SUM OF ALL RESPONSES TO PHQ QUESTIONS 1-9: 1
SUM OF ALL RESPONSES TO PHQ9 QUESTIONS 1 & 2: 1
SUM OF ALL RESPONSES TO PHQ QUESTIONS 1-9: 1
SUM OF ALL RESPONSES TO PHQ QUESTIONS 1-9: 1
SUM OF ALL RESPONSES TO PHQ9 QUESTIONS 1 & 2: 1
1. LITTLE INTEREST OR PLEASURE IN DOING THINGS: SEVERAL DAYS

## 2024-03-01 ENCOUNTER — TELEMEDICINE (OUTPATIENT)
Dept: INTERNAL MEDICINE CLINIC | Facility: CLINIC | Age: 64
End: 2024-03-01
Payer: OTHER GOVERNMENT

## 2024-03-01 DIAGNOSIS — N39.0 URINARY TRACT INFECTION WITHOUT HEMATURIA, SITE UNSPECIFIED: ICD-10-CM

## 2024-03-01 DIAGNOSIS — J34.89 SINUS PRESSURE: ICD-10-CM

## 2024-03-01 DIAGNOSIS — J01.40 ACUTE NON-RECURRENT PANSINUSITIS: ICD-10-CM

## 2024-03-01 DIAGNOSIS — R09.81 NASAL CONGESTION: Primary | ICD-10-CM

## 2024-03-01 DIAGNOSIS — R30.0 DYSURIA: ICD-10-CM

## 2024-03-01 PROCEDURE — 99213 OFFICE O/P EST LOW 20 MIN: CPT | Performed by: FAMILY MEDICINE

## 2024-03-01 RX ORDER — LEVOFLOXACIN 500 MG/1
500 TABLET, FILM COATED ORAL DAILY
Qty: 7 TABLET | Refills: 0 | Status: SHIPPED | OUTPATIENT
Start: 2024-03-01 | End: 2024-03-08

## 2024-03-01 ASSESSMENT — PATIENT HEALTH QUESTIONNAIRE - PHQ9
1. LITTLE INTEREST OR PLEASURE IN DOING THINGS: 0
SUM OF ALL RESPONSES TO PHQ QUESTIONS 1-9: 0
SUM OF ALL RESPONSES TO PHQ QUESTIONS 1-9: 0
2. FEELING DOWN, DEPRESSED OR HOPELESS: 0
SUM OF ALL RESPONSES TO PHQ QUESTIONS 1-9: 0
SUM OF ALL RESPONSES TO PHQ9 QUESTIONS 1 & 2: 0
SUM OF ALL RESPONSES TO PHQ QUESTIONS 1-9: 0

## 2024-03-01 NOTE — PROGRESS NOTES
Agustina Victoria, was evaluated through a synchronous (real-time) audio-video encounter. The patient (or guardian if applicable) is aware that this is a billable service, which includes applicable co-pays. This Virtual Visit was conducted with patient's (and/or legal guardian's) consent. Patient identification was verified, and a caregiver was present when appropriate.   The [facility] [address] [facility] [address]  Provider was located at Facility (Appt Dept): [address] [place] [address]      Agustina Victoria (:  1960) is a Established patient, presenting virtually for evaluation of the following:    Assessment & Plan   Below is the assessment and plan developed based on review of pertinent history, physical exam, labs, studies, and medications.  1. Nasal congestion  -     levoFLOXacin (LEVAQUIN) 500 MG tablet; Take 1 tablet by mouth daily for 7 days, Disp-7 tablet, R-0Normal  2. Sinus pressure  -     levoFLOXacin (LEVAQUIN) 500 MG tablet; Take 1 tablet by mouth daily for 7 days, Disp-7 tablet, R-0Normal  3. Acute non-recurrent pansinusitis  -     levoFLOXacin (LEVAQUIN) 500 MG tablet; Take 1 tablet by mouth daily for 7 days, Disp-7 tablet, R-0Normal  4. Dysuria  -     levoFLOXacin (LEVAQUIN) 500 MG tablet; Take 1 tablet by mouth daily for 7 days, Disp-7 tablet, R-0Normal  5. Urinary tract infection without hematuria, site unspecified  -     levoFLOXacin (LEVAQUIN) 500 MG tablet; Take 1 tablet by mouth daily for 7 days, Disp-7 tablet, R-0Normal    Use medication as prescribed, reviewed side effects and safety precautions. Nature and course of illness reviewed. Supportive care including OTC meds, increase in fluids, steam inhalation, and rest reviewed.     Recommended to drink plenty of fluids and water to flush the urine. Drink plenty of cranberry juice. Take AZO or pyridium OTC if you have lot of burning. Also instructed patient to wipe front to back after

## 2024-03-11 ASSESSMENT — ENCOUNTER SYMPTOMS
BLOOD IN STOOL: 0
EYE REDNESS: 0
VOMITING: 0
BACK PAIN: 0
CHEST TIGHTNESS: 0
SORE THROAT: 0
WHEEZING: 0
SINUS PAIN: 1
NAUSEA: 0
ABDOMINAL PAIN: 0
CONSTIPATION: 0
COUGH: 0
SHORTNESS OF BREATH: 0
EYE PAIN: 0
SINUS PRESSURE: 1
DIARRHEA: 0
RHINORRHEA: 0

## 2024-03-21 ENCOUNTER — OFFICE VISIT (OUTPATIENT)
Dept: INTERNAL MEDICINE CLINIC | Facility: CLINIC | Age: 64
End: 2024-03-21
Payer: OTHER GOVERNMENT

## 2024-03-21 VITALS
HEART RATE: 93 BPM | RESPIRATION RATE: 16 BRPM | OXYGEN SATURATION: 97 % | WEIGHT: 131 LBS | HEIGHT: 66 IN | BODY MASS INDEX: 21.05 KG/M2 | DIASTOLIC BLOOD PRESSURE: 85 MMHG | TEMPERATURE: 97.9 F | SYSTOLIC BLOOD PRESSURE: 135 MMHG

## 2024-03-21 DIAGNOSIS — R91.1 LUNG NODULE: ICD-10-CM

## 2024-03-21 DIAGNOSIS — F51.01 PRIMARY INSOMNIA: Primary | ICD-10-CM

## 2024-03-21 PROCEDURE — 3075F SYST BP GE 130 - 139MM HG: CPT | Performed by: FAMILY MEDICINE

## 2024-03-21 PROCEDURE — 3079F DIAST BP 80-89 MM HG: CPT | Performed by: FAMILY MEDICINE

## 2024-03-21 PROCEDURE — 99213 OFFICE O/P EST LOW 20 MIN: CPT | Performed by: FAMILY MEDICINE

## 2024-03-21 RX ORDER — ESZOPICLONE 2 MG/1
2 TABLET, FILM COATED ORAL NIGHTLY
Qty: 30 TABLET | Refills: 0 | Status: SHIPPED | OUTPATIENT
Start: 2024-03-21 | End: 2024-04-20

## 2024-03-21 SDOH — ECONOMIC STABILITY: INCOME INSECURITY: HOW HARD IS IT FOR YOU TO PAY FOR THE VERY BASICS LIKE FOOD, HOUSING, MEDICAL CARE, AND HEATING?: NOT HARD AT ALL

## 2024-03-21 SDOH — ECONOMIC STABILITY: FOOD INSECURITY: WITHIN THE PAST 12 MONTHS, THE FOOD YOU BOUGHT JUST DIDN'T LAST AND YOU DIDN'T HAVE MONEY TO GET MORE.: NEVER TRUE

## 2024-03-21 SDOH — ECONOMIC STABILITY: FOOD INSECURITY: WITHIN THE PAST 12 MONTHS, YOU WORRIED THAT YOUR FOOD WOULD RUN OUT BEFORE YOU GOT MONEY TO BUY MORE.: NEVER TRUE

## 2024-03-21 ASSESSMENT — ENCOUNTER SYMPTOMS
SHORTNESS OF BREATH: 0
BLOOD IN STOOL: 0
ABDOMINAL PAIN: 0

## 2024-03-21 ASSESSMENT — ANXIETY QUESTIONNAIRES
GAD7 TOTAL SCORE: 0
3. WORRYING TOO MUCH ABOUT DIFFERENT THINGS: NOT AT ALL
4. TROUBLE RELAXING: NOT AT ALL
5. BEING SO RESTLESS THAT IT IS HARD TO SIT STILL: NOT AT ALL
6. BECOMING EASILY ANNOYED OR IRRITABLE: NOT AT ALL
7. FEELING AFRAID AS IF SOMETHING AWFUL MIGHT HAPPEN: NOT AT ALL
IF YOU CHECKED OFF ANY PROBLEMS ON THIS QUESTIONNAIRE, HOW DIFFICULT HAVE THESE PROBLEMS MADE IT FOR YOU TO DO YOUR WORK, TAKE CARE OF THINGS AT HOME, OR GET ALONG WITH OTHER PEOPLE: NOT DIFFICULT AT ALL
1. FEELING NERVOUS, ANXIOUS, OR ON EDGE: NOT AT ALL
2. NOT BEING ABLE TO STOP OR CONTROL WORRYING: NOT AT ALL

## 2024-03-21 ASSESSMENT — PATIENT HEALTH QUESTIONNAIRE - PHQ9
1. LITTLE INTEREST OR PLEASURE IN DOING THINGS: NOT AT ALL
2. FEELING DOWN, DEPRESSED OR HOPELESS: NOT AT ALL
SUM OF ALL RESPONSES TO PHQ QUESTIONS 1-9: 0
SUM OF ALL RESPONSES TO PHQ9 QUESTIONS 1 & 2: 0
SUM OF ALL RESPONSES TO PHQ QUESTIONS 1-9: 0

## 2024-03-21 NOTE — PROGRESS NOTES
metoprolol succinate (TOPROL XL) 25 MG extended release tablet Take 1 tablet by mouth daily 90 tablet 5    ergocalciferol (ERGOCALCIFEROL) 1.25 MG (62081 UT) capsule Take 1 capsule by mouth every 7 days (Patient not taking: Reported on 11/30/2023) 4 capsule 2    valACYclovir (VALTREX) 500 MG tablet Take 1 tablet by mouth 2 times daily Prn 180 tablet 0    triamcinolone (KENALOG) 0.1 % cream Apply topically 2 times daily. (Patient not taking: Reported on 11/30/2023) 45 g 0    fluticasone (FLONASE) 50 MCG/ACT nasal spray 2 sprays by Nasal route daily 3 each 1    Cyanocobalamin (VITAMIN B-12 PO) Take by mouth daily (Patient not taking: Reported on 7/14/2023)      lansoprazole (PREVACID) 15 MG delayed release capsule Take by mouth every morning (before breakfast)       No current facility-administered medications for this visit.     Allergies   Allergen Reactions    Clavulanic Acid Anaphylaxis, Angioedema, Diarrhea, Headaches and Myalgia    Keflex [Cephalexin] Angioedema     Lips swelling    Sulfa Antibiotics Anaphylaxis       Objective:   /85 (Site: Left Upper Arm, Position: Sitting, Cuff Size: Medium Adult)   Pulse 93   Temp 97.9 °F (36.6 °C) (Temporal)   Resp 16   Ht 1.676 m (5' 6\")   Wt 59.4 kg (131 lb)   SpO2 97% Comment: Room Air  BMI 21.14 kg/m²     Physical Exam  Vitals and nursing note reviewed.   Constitutional:       General: She is not in acute distress.     Appearance: Normal appearance. She is normal weight. She is not ill-appearing, toxic-appearing or diaphoretic.   HENT:      Head: Normocephalic.      Nose: Nose normal.   Eyes:      Extraocular Movements: Extraocular movements intact.   Cardiovascular:      Rate and Rhythm: Normal rate and regular rhythm.      Heart sounds: No murmur heard.  Pulmonary:      Effort: Pulmonary effort is normal.      Breath sounds: Normal breath sounds.   Musculoskeletal:         General: No deformity.   Skin:     General: Skin is warm.   Neurological:

## 2024-05-15 ENCOUNTER — PATIENT MESSAGE (OUTPATIENT)
Age: 64
End: 2024-05-15

## 2024-05-15 ENCOUNTER — TELEPHONE (OUTPATIENT)
Age: 64
End: 2024-05-15

## 2024-05-15 DIAGNOSIS — R93.1 AGATSTON CORONARY ARTERY CALCIUM SCORE LESS THAN 100: Primary | ICD-10-CM

## 2024-05-15 DIAGNOSIS — I49.1 PAC (PREMATURE ATRIAL CONTRACTION): ICD-10-CM

## 2024-05-15 DIAGNOSIS — R00.2 PALPITATIONS: ICD-10-CM

## 2024-05-15 DIAGNOSIS — I10 HYPERTENSION: ICD-10-CM

## 2024-05-15 DIAGNOSIS — E03.9 ACQUIRED HYPOTHYROIDISM: ICD-10-CM

## 2024-05-15 DIAGNOSIS — R94.31 ABNORMAL ECG: ICD-10-CM

## 2024-05-15 DIAGNOSIS — R00.2 PALPITATIONS: Primary | ICD-10-CM

## 2024-05-15 PROCEDURE — 99441 PR PHYS/QHP TELEPHONE EVALUATION 5-10 MIN: CPT | Performed by: INTERNAL MEDICINE

## 2024-05-15 RX ORDER — METOPROLOL SUCCINATE 25 MG/1
25 TABLET, EXTENDED RELEASE ORAL 2 TIMES DAILY
Qty: 180 TABLET | Refills: 3 | Status: SHIPPED | OUTPATIENT
Start: 2024-05-15
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
## 2024-05-15 NOTE — TELEPHONE ENCOUNTER
Called pt. cgh   TCN following. HTH/SCP chart reviewed. No new TCN needs identified.  See ID note on 3/11/24.  Please see prior TCN note from 3/10/24 for most recent discharge planning considerations if indicated.     Completed:  Choice obtained: None.  See above.   SCP with [facility] PCP.  Refered to schedulers for Follow up appointment.  F/u scheduled for 3-20-24 at 4:20 PM.

## 2024-05-15 NOTE — TELEPHONE ENCOUNTER
----- Message from Alona Forte MA sent at 5/15/2024  4:41 PM EDT -----  Regarding: FW: BP medication   Contact: [phone]    ----- Message -----  From: Agustina Victoria  Sent: 5/15/2024   4:19 PM EDT  To: [facility] Cardiology Clinical Staff  Subject: BP medication                                    My pulse has been going up & down more than normal   lowest...52...highest 165...  I was wondering if it would be ok to up my pill more than 1 a day...or would that even help?   thank you

## 2024-05-15 NOTE — TELEPHONE ENCOUNTER
----- Message from Alona Forte MA sent at 5/15/2024  4:41 PM EDT -----  Regarding: FW: BP medication   Contact: [phone]    ----- Message -----  From: Agustina Victoria  Sent: 5/15/2024   4:19 PM EDT  To: [facility] Cardiology Clinical Staff  Subject: BP medication                                    My pulse has been going up & down more than normal   lowest...52...highest 165...  I was wondering if it would be ok to up my pill more than 1 a day...or would that even help?   thank you

## 2024-05-15 NOTE — TELEPHONE ENCOUNTER
Patient initiated outside phone call received.  Time spent reviewing chart, old records, formulating a plan, and responding is greater than 5 minutes.    The patient's most recent encounter has been reviewed.  Medications and appropriate lab work have been reviewed.  Appropriate imaging studies were reviewed.     Formulated plan and response:  60 to 70 ounce fluid intake daily  Minimize alcohol and caffeine  Low-sodium diet, 2 g daily or less  Increase Toprol-XL to twice daily dosing  Check BMP and magnesium and TSH sometime soon.  Daily BP and heart rate log each afternoon after resting and relaxing for 5 minutes, mail in, MyChart, or call with 10 days readings after increasing Toprol-XL to twice daily dosing    WILLIAM Chase MD

## 2024-05-16 DIAGNOSIS — I49.1 PAC (PREMATURE ATRIAL CONTRACTION): ICD-10-CM

## 2024-05-16 DIAGNOSIS — I10 HYPERTENSION: ICD-10-CM

## 2024-05-16 DIAGNOSIS — R94.31 ABNORMAL ECG: ICD-10-CM

## 2024-05-16 DIAGNOSIS — R00.2 PALPITATIONS: ICD-10-CM

## 2024-05-16 DIAGNOSIS — R93.1 AGATSTON CORONARY ARTERY CALCIUM SCORE LESS THAN 100: ICD-10-CM

## 2024-05-16 DIAGNOSIS — E03.9 ACQUIRED HYPOTHYROIDISM: ICD-10-CM

## 2024-05-16 LAB
ANION GAP SERPL CALC-SCNC: 12 MMOL/L (ref 9–18)
BUN SERPL-MCNC: 15 MG/DL (ref 8–23)
CALCIUM SERPL-MCNC: 10.5 MG/DL (ref 8.8–10.2)
CHLORIDE SERPL-SCNC: 101 MMOL/L (ref 98–107)
CO2 SERPL-SCNC: 25 MMOL/L (ref 20–28)
CREAT SERPL-MCNC: 0.83 MG/DL (ref 0.6–1.1)
GLUCOSE SERPL-MCNC: 87 MG/DL (ref 70–99)
MAGNESIUM SERPL-MCNC: 1.7 MG/DL (ref 1.8–2.4)
POTASSIUM SERPL-SCNC: 5 MMOL/L (ref 3.5–5.1)
SODIUM SERPL-SCNC: 137 MMOL/L (ref 136–145)
T4 FREE SERPL-MCNC: 1.1 NG/DL (ref 0.9–1.7)
TSH W FREE THYROID IF ABNORMAL: 6.53 UIU/ML (ref 0.27–4.2)

## 2024-05-17 ENCOUNTER — TELEPHONE (OUTPATIENT)
Age: 64
End: 2024-05-17

## 2024-05-17 RX ORDER — MAGNESIUM OXIDE 400 MG/1
400 TABLET ORAL DAILY
Qty: 30 TABLET | Refills: 0 | Status: SHIPPED | OUTPATIENT
Start: 2024-05-17

## 2024-05-17 NOTE — TELEPHONE ENCOUNTER
Spoke to pt and informed her of lab results per Dr. Chase. Pt voiced understanding and confirmed correct pharmacy for Magnesium rx.     Requested Prescriptions     Pending Prescriptions Disp Refills    magnesium oxide (MAG-OX) 400 MG tablet 30 tablet 0     Sig: Take 1 tablet by mouth daily      Rx pended and forwarded to Dr. Chase for signature.

## 2024-05-17 NOTE — TELEPHONE ENCOUNTER
Informed pt of Dr. Chase' response. Pt voiced understanding. States was on synthroid years agoa dn didn't like the way it made her feel.  She stopped it on her own.  She will discuss with PCP at [facility] in July. States her dtr is a nurse and they discussed results. Pt bought otc Mag glycinate and has taken Reflux med for years and read it could effect thyroid, mag, etc. She stopped Reflux med. Suggested digestive enzymes, probitiocs, prn for reflux.  Pt voiced understanding and agreement with POC.  cgh

## 2024-05-17 NOTE — TELEPHONE ENCOUNTER
----- Message from Angelo Chase MD sent at 5/16/2024  5:21 PM EDT -----  Magnesium a bit low.  Give mag oxide 400 mg daily for 1 month.  TSH a bit elevated, she needs to discuss this with her PCP.  She may be getting hypothyroid.  ----- Message -----  From: Jasbir Strickland Incoming [place] [facility]  Sent: 5/16/2024   4:28 PM EDT  To: Angelo Chase MD

## 2024-05-17 NOTE — TELEPHONE ENCOUNTER
----- Message from Angelo Chase MD sent at 5/17/2024  7:34 AM EDT -----  TSH is mildly elevated.  May be getting subclinical hypothyroidism.  Refer to PCP.  ----- Message -----  From: Jasbir Strickland Incoming [place] [facility]  Sent: 5/16/2024   4:28 PM EDT  To: Angelo Chase MD

## 2024-05-21 ENCOUNTER — APPOINTMENT (RX ONLY)
Dept: URBAN - METROPOLITAN AREA CLINIC 329 | Facility: CLINIC | Age: 64
Setting detail: DERMATOLOGY
End: 2024-05-21

## 2024-05-21 DIAGNOSIS — L82.0 INFLAMED SEBORRHEIC KERATOSIS: ICD-10-CM | Status: INADEQUATELY CONTROLLED

## 2024-05-21 DIAGNOSIS — L82.1 OTHER SEBORRHEIC KERATOSIS: ICD-10-CM

## 2024-05-21 PROCEDURE — 99212 OFFICE O/P EST SF 10 MIN: CPT | Mod: 25

## 2024-05-21 PROCEDURE — ? FULL BODY SKIN EXAM - DECLINED

## 2024-05-21 PROCEDURE — ? TREATMENT REGIMEN

## 2024-05-21 PROCEDURE — ? COUNSELING

## 2024-05-21 PROCEDURE — ? LIQUID NITROGEN

## 2024-05-21 PROCEDURE — 17111 DESTRUCTION B9 LESIONS 15/>: CPT

## 2024-05-21 ASSESSMENT — LOCATION DETAILED DESCRIPTION DERM
LOCATION DETAILED: RIGHT DISTAL DORSAL FOREARM
LOCATION DETAILED: LEFT ANKLE
LOCATION DETAILED: RIGHT PROXIMAL CALF
LOCATION DETAILED: LEFT DISTAL DORSAL FOREARM
LOCATION DETAILED: RIGHT ANTERIOR DISTAL UPPER ARM
LOCATION DETAILED: RIGHT LATERAL SUPERIOR CHEST
LOCATION DETAILED: RIGHT ULNAR DORSAL HAND
LOCATION DETAILED: LEFT PROXIMAL DORSAL FOREARM
LOCATION DETAILED: RIGHT DISTAL POSTERIOR UPPER ARM
LOCATION DETAILED: RIGHT LATERAL DISTAL PRETIBIAL REGION
LOCATION DETAILED: RIGHT PROXIMAL PRETIBIAL REGION
LOCATION DETAILED: RIGHT DISTAL PRETIBIAL REGION
LOCATION DETAILED: LEFT LATERAL DISTAL PRETIBIAL REGION
LOCATION DETAILED: LEFT RADIAL DORSAL HAND
LOCATION DETAILED: LEFT VENTRAL DISTAL FOREARM
LOCATION DETAILED: LEFT PROXIMAL PRETIBIAL REGION
LOCATION DETAILED: RIGHT RADIAL DORSAL HAND
LOCATION DETAILED: LEFT DISTAL PRETIBIAL REGION
LOCATION DETAILED: RIGHT PROXIMAL DORSAL FOREARM
LOCATION DETAILED: RIGHT DORSAL WRIST

## 2024-05-21 ASSESSMENT — LOCATION SIMPLE DESCRIPTION DERM
LOCATION SIMPLE: RIGHT PRETIBIAL REGION
LOCATION SIMPLE: CHEST
LOCATION SIMPLE: RIGHT WRIST
LOCATION SIMPLE: RIGHT UPPER ARM
LOCATION SIMPLE: RIGHT FOREARM
LOCATION SIMPLE: RIGHT HAND
LOCATION SIMPLE: RIGHT CALF
LOCATION SIMPLE: RIGHT POSTERIOR UPPER ARM
LOCATION SIMPLE: LEFT PRETIBIAL REGION
LOCATION SIMPLE: LEFT ANKLE
LOCATION SIMPLE: LEFT HAND
LOCATION SIMPLE: LEFT FOREARM

## 2024-05-21 ASSESSMENT — LOCATION ZONE DERM
LOCATION ZONE: TRUNK
LOCATION ZONE: HAND
LOCATION ZONE: ARM
LOCATION ZONE: LEG

## 2024-05-21 NOTE — PROCEDURE: LIQUID NITROGEN
Show Topical Anesthesia Variable?: Yes
Detail Level: Detailed
Consent: The patient's consent was obtained including but not limited to risks of crusting, scabbing, blistering, scarring, darker or lighter pigmentary change, recurrence, incomplete removal and infection.
Include Z78.9 (Other Specified Conditions Influencing Health Status) As An Associated Diagnosis?: No
Medical Necessity Clause: This procedure was medically necessary because the lesions that were treated were:
Spray Paint Text: The liquid nitrogen was applied to the skin utilizing a spray paint frosting technique.
Medical Necessity Information: It is in your best interest to select a reason for this procedure from the list below. All of these items fulfill various CMS LCD requirements except the new and changing color options.
Post-Care Instructions: I reviewed with the patient in detail post-care instructions. Patient is to wear sunprotection, and avoid picking at any of the treated lesions. Pt may apply Vaseline to crusted or scabbing areas.

## 2024-05-21 NOTE — HPI: SKIN LESION
How Severe Is Your Skin Lesion?: mild
Is This A New Presentation, Or A Follow-Up?: Growths
Additional History: Patient states she has several new spots on her arms and right leg that she would like to have treated. She states her granddaughter will pick at them.

## 2024-07-08 ENCOUNTER — NURSE ONLY (OUTPATIENT)
Dept: INTERNAL MEDICINE CLINIC | Facility: CLINIC | Age: 64
End: 2024-07-08

## 2024-07-08 DIAGNOSIS — E78.00 PURE HYPERCHOLESTEROLEMIA: ICD-10-CM

## 2024-07-08 DIAGNOSIS — E03.9 ACQUIRED HYPOTHYROIDISM: ICD-10-CM

## 2024-07-08 DIAGNOSIS — R73.01 IMPAIRED FASTING BLOOD SUGAR: ICD-10-CM

## 2024-07-08 DIAGNOSIS — E55.9 VITAMIN D DEFICIENCY: ICD-10-CM

## 2024-07-08 DIAGNOSIS — R76.8 HEPATITIS C ANTIBODY POSITIVE IN BLOOD: ICD-10-CM

## 2024-07-08 LAB
25(OH)D3 SERPL-MCNC: 49.7 NG/ML (ref 30–100)
ALBUMIN SERPL-MCNC: 4.5 G/DL (ref 3.2–4.6)
ALBUMIN/GLOB SERPL: 1.3 (ref 1–1.9)
ALP SERPL-CCNC: 80 U/L (ref 35–104)
ALT SERPL-CCNC: 12 U/L (ref 12–65)
ANION GAP SERPL CALC-SCNC: 17 MMOL/L (ref 9–18)
AST SERPL-CCNC: 29 U/L (ref 15–37)
BILIRUB SERPL-MCNC: 0.4 MG/DL (ref 0–1.2)
BUN SERPL-MCNC: 9 MG/DL (ref 8–23)
CALCIUM SERPL-MCNC: 9.9 MG/DL (ref 8.8–10.2)
CHLORIDE SERPL-SCNC: 100 MMOL/L (ref 98–107)
CHOLEST SERPL-MCNC: 123 MG/DL (ref 0–200)
CO2 SERPL-SCNC: 20 MMOL/L (ref 20–28)
CREAT SERPL-MCNC: 0.77 MG/DL (ref 0.6–1.1)
ERYTHROCYTE [DISTWIDTH] IN BLOOD BY AUTOMATED COUNT: 12.3 % (ref 11.9–14.6)
EST. AVERAGE GLUCOSE BLD GHB EST-MCNC: 113 MG/DL
GLOBULIN SER CALC-MCNC: 3.5 G/DL (ref 2.3–3.5)
GLUCOSE SERPL-MCNC: 88 MG/DL (ref 70–99)
HBA1C MFR BLD: 5.6 % (ref 0–5.6)
HCT VFR BLD AUTO: 44.2 % (ref 35.8–46.3)
HDLC SERPL-MCNC: 52 MG/DL (ref 40–60)
HDLC SERPL: 2.4 (ref 0–5)
HGB BLD-MCNC: 15.2 G/DL (ref 11.7–15.4)
LDLC SERPL CALC-MCNC: 28 MG/DL (ref 0–100)
MCH RBC QN AUTO: 35 PG (ref 26.1–32.9)
MCHC RBC AUTO-ENTMCNC: 34.4 G/DL (ref 31.4–35)
MCV RBC AUTO: 101.8 FL (ref 82–102)
NRBC # BLD: 0 K/UL (ref 0–0.2)
PLATELET # BLD AUTO: 350 K/UL (ref 150–450)
PMV BLD AUTO: 10.1 FL (ref 9.4–12.3)
POTASSIUM SERPL-SCNC: 4.9 MMOL/L (ref 3.5–5.1)
PROT SERPL-MCNC: 8 G/DL (ref 6.3–8.2)
RBC # BLD AUTO: 4.34 M/UL (ref 4.05–5.2)
SODIUM SERPL-SCNC: 137 MMOL/L (ref 136–145)
TRIGL SERPL-MCNC: 216 MG/DL (ref 0–150)
TSH, 3RD GENERATION: 2.96 UIU/ML (ref 0.27–4.2)
VLDLC SERPL CALC-MCNC: 43 MG/DL (ref 6–23)
WBC # BLD AUTO: 7.2 K/UL (ref 4.3–11.1)

## 2024-07-09 LAB
HCV RNA SERPL NAA+PROBE-ACNC: NORMAL IU/ML
TEST INFORMATION: NORMAL

## 2024-07-15 SDOH — HEALTH STABILITY: PHYSICAL HEALTH: ON AVERAGE, HOW MANY MINUTES DO YOU ENGAGE IN EXERCISE AT THIS LEVEL?: 50 MIN

## 2024-07-15 SDOH — HEALTH STABILITY: PHYSICAL HEALTH: ON AVERAGE, HOW MANY DAYS PER WEEK DO YOU ENGAGE IN MODERATE TO STRENUOUS EXERCISE (LIKE A BRISK WALK)?: 4 DAYS

## 2024-07-18 ENCOUNTER — OFFICE VISIT (OUTPATIENT)
Dept: INTERNAL MEDICINE CLINIC | Facility: CLINIC | Age: 64
End: 2024-07-18
Payer: OTHER GOVERNMENT

## 2024-07-18 VITALS
HEART RATE: 86 BPM | HEIGHT: 66 IN | BODY MASS INDEX: 20.57 KG/M2 | OXYGEN SATURATION: 96 % | TEMPERATURE: 97.9 F | DIASTOLIC BLOOD PRESSURE: 72 MMHG | WEIGHT: 128 LBS | SYSTOLIC BLOOD PRESSURE: 127 MMHG

## 2024-07-18 DIAGNOSIS — Z79.890 POSTMENOPAUSAL HRT (HORMONE REPLACEMENT THERAPY): ICD-10-CM

## 2024-07-18 DIAGNOSIS — K21.9 GASTROESOPHAGEAL REFLUX DISEASE WITHOUT ESOPHAGITIS: ICD-10-CM

## 2024-07-18 DIAGNOSIS — R91.1 LUNG NODULE: ICD-10-CM

## 2024-07-18 DIAGNOSIS — E03.9 ACQUIRED HYPOTHYROIDISM: ICD-10-CM

## 2024-07-18 DIAGNOSIS — R73.01 IMPAIRED FASTING BLOOD SUGAR: ICD-10-CM

## 2024-07-18 DIAGNOSIS — E55.9 VITAMIN D DEFICIENCY: ICD-10-CM

## 2024-07-18 DIAGNOSIS — I10 PRIMARY HYPERTENSION: ICD-10-CM

## 2024-07-18 DIAGNOSIS — Z00.00 ENCOUNTER FOR ANNUAL PHYSICAL EXAM: Primary | ICD-10-CM

## 2024-07-18 DIAGNOSIS — E78.00 PURE HYPERCHOLESTEROLEMIA: ICD-10-CM

## 2024-07-18 PROCEDURE — 3078F DIAST BP <80 MM HG: CPT | Performed by: FAMILY MEDICINE

## 2024-07-18 PROCEDURE — 3074F SYST BP LT 130 MM HG: CPT | Performed by: FAMILY MEDICINE

## 2024-07-18 PROCEDURE — 99396 PREV VISIT EST AGE 40-64: CPT | Performed by: FAMILY MEDICINE

## 2024-07-18 NOTE — PROGRESS NOTES
Nicolle Brice,   [facility]  [address] [place] [address]  Phone [phone]  Fax [phone]      Agustina Victoria (: 1960) is a 63 y.o. female, here for evaluation of the following chief complaint(s):  Established New Doctor (Last physical 23, mammogram done 23, eye exam done yearly, colonoscopy done 10/5/20 - negative result  and history of hysterectomy )       ASSESSMENT/PLAN:  1. Encounter for annual physical exam  -     CBC with Auto Differential; Future  -     Comprehensive Metabolic Panel; Future  -     Hemoglobin A1C; Future  -     Lipid Panel; Future  -     TSH; Future  -     Vitamin D 25 Hydroxy; Future  2. Primary hypertension  Overview:  Tolerating medication well for tachycardia/elevated pulse. Achieving desired therapeutic response with   Hypertension Medications       Beta Blockers Cardio-Selective       metoprolol succinate (TOPROL XL) 25 MG extended release tablet Take 1 tablet by mouth 2 times daily         --will continue. Will periodically review and adjust if needed.  Encourage home monitoring.    --follows with cardiology, on medication for elevated HR and not for HTN  Orders:  -     CBC with Auto Differential; Future  -     Comprehensive Metabolic Panel; Future  -     Lipid Panel; Future  -     TSH; Future  3. Pure hypercholesterolemia  Overview:  On repatha   Followed by cardiologist  Orders:  -     CBC with Auto Differential; Future  -     Comprehensive Metabolic Panel; Future  -     Lipid Panel; Future  -     TSH; Future  4. Acquired hypothyroidism  Overview:  Never been on medication for it  Does not want to be on any medication  Normal TSH level, will monitor for now  No symptoms   Orders:  -     CBC with Auto Differential; Future  -     Comprehensive Metabolic Panel; Future  -     TSH; Future  5. Postmenopausal HRT (hormone replacement therapy)  Overview:  Patient was counseled regarding harmonal control. Discussed in

## 2024-07-24 ENCOUNTER — TRANSCRIBE ORDERS (OUTPATIENT)
Dept: SCHEDULING | Age: 64
End: 2024-07-24

## 2024-07-24 DIAGNOSIS — Z12.31 ENCOUNTER FOR MAMMOGRAM TO ESTABLISH BASELINE MAMMOGRAM: Primary | ICD-10-CM

## 2024-07-24 NOTE — PROGRESS NOTES
Name:  Agustina Victoria  YOB: 1960   MRN: [id number]      Office Visit: 7/25/2024     [place] [facility]      Assessment & Plan    (Medical Decision Making)    Impression: 63 y.o. female     1. Centrilobular emphysema (HCC)  --mild.  Managed with prn albuterol only.  Need to treat exacerbations and infections promptly.  Encouraged to get flu vaccine and prevnar, but patient declines.    2. Personal history of tobacco use  --20 pack year history, quit in 2022.  Needs LDCT in January--this has been ordered, but not scheduled yet.  Scheduling information given to patient.    3. Gastroesophageal reflux disease without esophagitis  --remains on PPI.    Orders Placed This Encounter   Medications    albuterol sulfate HFA (VENTOLIN HFA) 108 (90 Base) MCG/ACT inhaler     Sig: Inhale 2 puffs into the lungs 4 times daily as needed for Wheezing     Dispense:  1 each     Refill:  11     Do not fill until patient requests     No orders of the defined types were placed in this encounter.    Follow-up and Dispositions    Return in about 1 year (around 7/25/2025) for [facility]  office, Arrive 15 minutes prior to appt time, spirometry.     Collaborating physician is Dr. Allison Almonte, APRN - CNP      _________________________________________________________________________    HISTORY OF PRESENT ILLNESS:    Ms. Agustina Victoria is a 63 y.o. female who is seen at [facility] for  Emphysema     The patient is a 63-year-old female who is seen for follow-up of emphysema.  Had pneumonia in December 2023 and was treated in the ER.  She remains on albuterol prn, which she has not required.  Rare cough.  No wheezing.  No PEÑALOZA.      Had LDCT 1/25/24 which did not reveal any worrisome findings for lung cancer.     Denies any reflux.  Remains on PPI.    No exacerbations since last visit.    REVIEW OF SYSTEMS: 10 point review of systems is negative except as reported in HPI.    PHYSICAL

## 2024-07-25 ENCOUNTER — OFFICE VISIT (OUTPATIENT)
Age: 64
End: 2024-07-25
Payer: OTHER GOVERNMENT

## 2024-07-25 VITALS
HEIGHT: 66 IN | BODY MASS INDEX: 20.73 KG/M2 | HEART RATE: 92 BPM | RESPIRATION RATE: 15 BRPM | TEMPERATURE: 98.5 F | SYSTOLIC BLOOD PRESSURE: 119 MMHG | WEIGHT: 129 LBS | DIASTOLIC BLOOD PRESSURE: 82 MMHG | OXYGEN SATURATION: 98 %

## 2024-07-25 DIAGNOSIS — J43.2 CENTRILOBULAR EMPHYSEMA (HCC): Primary | ICD-10-CM

## 2024-07-25 DIAGNOSIS — K21.9 GASTROESOPHAGEAL REFLUX DISEASE WITHOUT ESOPHAGITIS: ICD-10-CM

## 2024-07-25 DIAGNOSIS — Z87.891 PERSONAL HISTORY OF TOBACCO USE: ICD-10-CM

## 2024-07-25 PROCEDURE — 3079F DIAST BP 80-89 MM HG: CPT | Performed by: NURSE PRACTITIONER

## 2024-07-25 PROCEDURE — 3074F SYST BP LT 130 MM HG: CPT | Performed by: NURSE PRACTITIONER

## 2024-07-25 PROCEDURE — 99214 OFFICE O/P EST MOD 30 MIN: CPT | Performed by: NURSE PRACTITIONER

## 2024-07-25 PROCEDURE — G2211 COMPLEX E/M VISIT ADD ON: HCPCS | Performed by: NURSE PRACTITIONER

## 2024-07-25 RX ORDER — ALBUTEROL SULFATE 90 UG/1
2 AEROSOL, METERED RESPIRATORY (INHALATION) 4 TIMES DAILY PRN
Qty: 1 EACH | Refills: 11 | Status: SHIPPED | OUTPATIENT
Start: 2024-07-25

## 2024-07-25 NOTE — PATIENT INSTRUCTIONS
I have ordered screening CT due in January, 2025.  You should receive a call from scheduling within 2-3 business days.  If you do not hear from them, please call [phone] to schedule your test.

## 2024-08-19 RX ORDER — EVOLOCUMAB 140 MG/ML
INJECTION, SOLUTION SUBCUTANEOUS
Qty: 2 ADJUSTABLE DOSE PRE-FILLED PEN SYRINGE | Refills: 11 | Status: SHIPPED | OUTPATIENT
Start: 2024-08-19

## 2024-08-19 NOTE — TELEPHONE ENCOUNTER
Requested Prescriptions     Pending Prescriptions Disp Refills    REPATHA SURECLICK 140 MG/ML SOAJ [Pharmacy Med Name: REPATHA SURECLICK PEN 1ML 2'S 140MG] 2 Adjustable Dose Pre-filled Pen Syringe 11     Sig: INJECT 140 MG UNDER THE SKIN EVERY 14 DAYS     Verified rx. Last OV 11/30/23. Erx to pharm on file.

## 2024-09-10 NOTE — TELEPHONE ENCOUNTER
Informed pt of Dr. Lowe' response and confirmed lab orders in computer, response sent via [employer] for review.   To ER for /> sustained, or increased sx. Pt voiced understanding and thanks.  Cgh  Orders Placed This Encounter   Procedures    Basic Metabolic Panel     Standing Status:   Future     Standing Expiration Date:   5/15/2025    Magnesium     Standing Status:   Future     Standing Expiration Date:   5/15/2025    TSH with Reflex     Standing Status:   Future     Standing Expiration Date:   5/15/2025     Requested Prescriptions     Signed Prescriptions Disp Refills    metoprolol succinate (TOPROL XL) 25 MG extended release tablet 180 tablet 3     Sig: Take 1 tablet by mouth 2 times daily     Authorizing Provider: GINGER LOWE     Ordering User: ISSAC SHANNON        yes

## 2024-10-02 ENCOUNTER — PATIENT MESSAGE (OUTPATIENT)
Age: 64
End: 2024-10-02

## 2024-10-02 NOTE — TELEPHONE ENCOUNTER
Spoke with pt and informed her that the prescriptions have refills on file and to call the pharmacy for refills.

## 2024-10-10 ENCOUNTER — APPOINTMENT (RX ONLY)
Dept: URBAN - METROPOLITAN AREA CLINIC 329 | Facility: CLINIC | Age: 64
Setting detail: DERMATOLOGY
End: 2024-10-10

## 2024-10-10 DIAGNOSIS — L28.1 PRURIGO NODULARIS: ICD-10-CM | Status: INADEQUATELY CONTROLLED

## 2024-10-10 DIAGNOSIS — L82.0 INFLAMED SEBORRHEIC KERATOSIS: ICD-10-CM | Status: INADEQUATELY CONTROLLED

## 2024-10-10 PROCEDURE — ? COUNSELING

## 2024-10-10 PROCEDURE — ? PRESCRIPTION MEDICATION MANAGEMENT

## 2024-10-10 PROCEDURE — ? MDM - TREATMENT GOALS

## 2024-10-10 PROCEDURE — ? FULL BODY SKIN EXAM - DECLINED

## 2024-10-10 PROCEDURE — 99214 OFFICE O/P EST MOD 30 MIN: CPT | Mod: 25

## 2024-10-10 PROCEDURE — ? LIQUID NITROGEN

## 2024-10-10 PROCEDURE — ? ADDITIONAL NOTES

## 2024-10-10 PROCEDURE — 17110 DESTRUCTION B9 LES UP TO 14: CPT

## 2024-10-10 ASSESSMENT — LOCATION DETAILED DESCRIPTION DERM
LOCATION DETAILED: RIGHT ANTERIOR PROXIMAL UPPER ARM
LOCATION DETAILED: RIGHT DORSAL WRIST
LOCATION DETAILED: LEFT PROXIMAL DORSAL FOREARM
LOCATION DETAILED: LEFT DISTAL RADIAL DORSAL FOREARM
LOCATION DETAILED: LEFT ANTERIOR DISTAL UPPER ARM
LOCATION DETAILED: RIGHT PROXIMAL DORSAL FOREARM
LOCATION DETAILED: RIGHT ANTERIOR DISTAL UPPER ARM
LOCATION DETAILED: RIGHT LATERAL DISTAL PRETIBIAL REGION
LOCATION DETAILED: RIGHT SUPERIOR POSTERIOR NECK
LOCATION DETAILED: RIGHT PROXIMAL LATERAL DORSAL FOREARM
LOCATION DETAILED: RIGHT LATERAL PROXIMAL UPPER ARM
LOCATION DETAILED: RIGHT PROXIMAL PRETIBIAL REGION

## 2024-10-10 ASSESSMENT — LOCATION SIMPLE DESCRIPTION DERM
LOCATION SIMPLE: RIGHT PRETIBIAL REGION
LOCATION SIMPLE: POSTERIOR NECK
LOCATION SIMPLE: LEFT UPPER ARM
LOCATION SIMPLE: RIGHT FOREARM
LOCATION SIMPLE: RIGHT UPPER ARM
LOCATION SIMPLE: RIGHT WRIST
LOCATION SIMPLE: LEFT FOREARM

## 2024-10-10 ASSESSMENT — ITCH INTENSITY: HOW SEVERE IS YOUR ITCHING?: 7

## 2024-10-10 ASSESSMENT — LOCATION ZONE DERM
LOCATION ZONE: NECK
LOCATION ZONE: LEG
LOCATION ZONE: ARM

## 2024-10-10 NOTE — PROCEDURE: PRESCRIPTION MEDICATION MANAGEMENT
Render In Strict Bullet Format?: No
Continue Regimen: Fluticasone ointment - apply to affected areas twice daily until lesions have flattened.
Detail Level: Zone

## 2024-10-10 NOTE — PROCEDURE: ADDITIONAL NOTES
Detail Level: Simple
Render Risk Assessment In Note?: no
Additional Notes: Patient has tried and failed fluticasone ointment and Triamcinolone. Patient has more than 25 nodules present. \\n\\nDiscussed Dupixent with patient. Patient states she is already on an injection and would prefer not to do another injection.

## 2024-10-10 NOTE — PROCEDURE: LIQUID NITROGEN
Medical Necessity Clause: This procedure was medically necessary because the lesions that were treated were:
Show Topical Anesthesia Variable?: Yes
Post-Care Instructions: I reviewed with the patient in detail post-care instructions. Patient is to wear sunprotection, and avoid picking at any of the treated lesions. Pt may apply Vaseline to crusted or scabbing areas.
Spray Paint Technique: No
Spray Paint Text: The liquid nitrogen was applied to the skin utilizing a spray paint frosting technique.
Detail Level: Detailed
Medical Necessity Information: It is in your best interest to select a reason for this procedure from the list below. All of these items fulfill various CMS LCD requirements except the new and changing color options.
Consent: The patient's consent was obtained including but not limited to risks of crusting, scabbing, blistering, scarring, darker or lighter pigmentary change, recurrence, incomplete removal and infection.

## 2024-10-10 NOTE — HPI: EVALUATION OF SKIN LESION(S)
What Type Of Note Output Would You Prefer (Optional)?: Bullet Format
Hpi Title: Evaluation of Skin Lesions
How Severe Are Your Spot(S)?: mild
Additional History: Patient states she has several rough and scaly lesions on her arms. She states they are very itchy.

## 2024-10-18 ENCOUNTER — HOSPITAL ENCOUNTER (OUTPATIENT)
Dept: MAMMOGRAPHY | Age: 64
Discharge: HOME | End: 2024-10-21
Attending: FAMILY MEDICINE
Payer: OTHER GOVERNMENT

## 2024-10-18 VITALS — BODY MASS INDEX: 20.41 KG/M2 | WEIGHT: 127 LBS | HEIGHT: 66 IN

## 2024-10-18 DIAGNOSIS — Z12.31 ENCOUNTER FOR MAMMOGRAM TO ESTABLISH BASELINE MAMMOGRAM: ICD-10-CM

## 2024-10-18 PROCEDURE — 77067 SCR MAMMO BI INCL CAD: CPT

## 2024-10-22 ENCOUNTER — TELEPHONE (OUTPATIENT)
Dept: PULMONOLOGY | Age: 64
End: 2024-10-22

## 2024-10-22 ENCOUNTER — TELEPHONE (OUTPATIENT)
Dept: INTERNAL MEDICINE CLINIC | Facility: CLINIC | Age: 64
End: 2024-10-22

## 2024-10-22 NOTE — TELEPHONE ENCOUNTER
----- Message from Dr. Nicolle Brice DO sent at 10/22/2024  2:48 PM EDT -----  Please inform that mammogram was negative, repeat in one year.

## 2024-12-03 ENCOUNTER — OFFICE VISIT (OUTPATIENT)
Age: 64
End: 2024-12-03
Payer: OTHER GOVERNMENT

## 2024-12-03 VITALS
DIASTOLIC BLOOD PRESSURE: 70 MMHG | HEART RATE: 89 BPM | BODY MASS INDEX: 21.28 KG/M2 | WEIGHT: 132.4 LBS | SYSTOLIC BLOOD PRESSURE: 130 MMHG | HEIGHT: 66 IN

## 2024-12-03 DIAGNOSIS — I49.1 PAC (PREMATURE ATRIAL CONTRACTION): ICD-10-CM

## 2024-12-03 DIAGNOSIS — E03.9 ACQUIRED HYPOTHYROIDISM: ICD-10-CM

## 2024-12-03 DIAGNOSIS — K21.9 GASTROESOPHAGEAL REFLUX DISEASE WITHOUT ESOPHAGITIS: ICD-10-CM

## 2024-12-03 DIAGNOSIS — R00.2 PALPITATIONS: ICD-10-CM

## 2024-12-03 DIAGNOSIS — E78.2 MIXED HYPERLIPIDEMIA: ICD-10-CM

## 2024-12-03 DIAGNOSIS — I10 PRIMARY HYPERTENSION: Primary | ICD-10-CM

## 2024-12-03 DIAGNOSIS — R93.1 AGATSTON CORONARY ARTERY CALCIUM SCORE LESS THAN 100: ICD-10-CM

## 2024-12-03 PROCEDURE — 99214 OFFICE O/P EST MOD 30 MIN: CPT | Performed by: INTERNAL MEDICINE

## 2024-12-03 PROCEDURE — 93000 ELECTROCARDIOGRAM COMPLETE: CPT | Performed by: INTERNAL MEDICINE

## 2024-12-03 PROCEDURE — 3078F DIAST BP <80 MM HG: CPT | Performed by: INTERNAL MEDICINE

## 2024-12-03 PROCEDURE — 3075F SYST BP GE 130 - 139MM HG: CPT | Performed by: INTERNAL MEDICINE

## 2024-12-03 RX ORDER — METOPROLOL SUCCINATE 25 MG/1
25 TABLET, EXTENDED RELEASE ORAL DAILY
Qty: 90 TABLET | Refills: 3 | Status: SHIPPED | OUTPATIENT
Start: 2024-12-03

## 2024-12-03 ASSESSMENT — ENCOUNTER SYMPTOMS: COUGH: 0

## 2025-01-16 ENCOUNTER — TELEPHONE (OUTPATIENT)
Dept: PULMONOLOGY | Age: 65
End: 2025-01-16

## 2025-01-16 NOTE — TELEPHONE ENCOUNTER
TRIAGE CALL      Complaint: SOB/coughing  Cough: yes  Productive:  yes  Bloody Sputum:  no  Increased SOB/Wheezing:  yes  Duration: 3 weeks  Fever/Chills: yes Low grade fever(nothing over 100)  OTC Meds tried: sinus medicine  02 to dropping to 94%

## 2025-01-16 NOTE — TELEPHONE ENCOUNTER
Returned patient's call regarding SOB, cough and wheezing.  Patient explained that she began having symptoms of cough, SOB and wheezing about 3 weeks ago and was seen at Urgent care.  Urgent care performed a Flu and COVID test which was negative as well as performed a CXR which showed no signs of PNA.  Patient was placed on a 5 day course of steroids and states that she felt better but once the course was completed her symptoms returned.  Patient's SpO2 is remaining around 94% on RA at this time and patient has been using her Albuterol HFA q4hr PRN.  Cough is productive with clear to tan at this time but occasionally she coughs so much and so hard that she will vomit.  She also stated that these symptoms occurred last year around this same time and she required about 8 weeks of steroids in order to completely recover.  Offered and scheduled patient an appointment with Ms. Veena Locke NP tomorrow morning as her normal physician is not available until later next week.

## 2025-01-16 NOTE — PROGRESS NOTES
[facility]  [address]  [place] [address]  [phone]    Patient Name:  Agustina Victoria    YOB: 1960    Office Visit 1/17/2025      CHIEF COMPLAINT:      Chief Complaint   Patient presents with    Cough    Centrilobular emphysema         ASSESSMENT:   (Medical Decision Making)                                                                                                                                          Encounter Diagnoses   Name Primary?    COPD exacerbation (HCC) Yes    Centrilobular emphysema (HCC)     Acute bronchitis, unspecified organism      Recently treated for COPD exacerbation/acute bronchitis.  Improved while she was on prednisone and antibiotics but had recurrent symptoms after completing it.  She has rhonchi throughout on exam today.  She has been using albuterol inhaler 4 times daily.    Nebulizer treatment with albuterol 2.5 mg was administered during visit.  There was some improvement in breath sounds following nebulizer treatment although there was some persistent rhonchi in the lung bases.                      PLAN:     Dexamethasone 8 mg IM was administered.  Albuterol 2.5 mg was administered via nebulizer.    Begin Trelegy inhaler, 200, 1 inhalation every morning, gargle with water after use.  Samples provided and today's dose was taken in the office.  Prescription is sent to her pharmacy.  Advised to obtain online coupon or assistance from pharmacist for coupon.  Advised to contact us if Trelegy is not covered by insurance.    OTC mucolytic, (mucinex or generic equivalent of guaifenesin), 2400mg in 24 hours in divided doses as needed to thin mucous.  Can take OTC mucinex lozenges as she is doing, which is actually dextromethorphan.    Prednisone taper beginning tomorrow morning after breakfast.  Refrain from taking over-the-counter nonsteroidals while on prednisone, such as ibuprofen, Motrin, Advil, Aleve,

## 2025-01-17 ENCOUNTER — OFFICE VISIT (OUTPATIENT)
Dept: PULMONOLOGY | Age: 65
End: 2025-01-17

## 2025-01-17 VITALS
SYSTOLIC BLOOD PRESSURE: 130 MMHG | BODY MASS INDEX: 20.83 KG/M2 | RESPIRATION RATE: 17 BRPM | OXYGEN SATURATION: 98 % | HEART RATE: 98 BPM | DIASTOLIC BLOOD PRESSURE: 72 MMHG | HEIGHT: 66 IN | WEIGHT: 129.6 LBS | TEMPERATURE: 97.9 F

## 2025-01-17 DIAGNOSIS — J43.2 CENTRILOBULAR EMPHYSEMA (HCC): ICD-10-CM

## 2025-01-17 DIAGNOSIS — J44.1 COPD EXACERBATION (HCC): Primary | ICD-10-CM

## 2025-01-17 DIAGNOSIS — J20.9 ACUTE BRONCHITIS, UNSPECIFIED ORGANISM: ICD-10-CM

## 2025-01-17 RX ORDER — FLUTICASONE FUROATE, UMECLIDINIUM BROMIDE AND VILANTEROL TRIFENATATE 200; 62.5; 25 UG/1; UG/1; UG/1
POWDER RESPIRATORY (INHALATION)
Qty: 1 EACH | Refills: 3 | Status: SHIPPED | OUTPATIENT
Start: 2025-01-17

## 2025-01-17 RX ORDER — AZITHROMYCIN 250 MG/1
TABLET, FILM COATED ORAL
Qty: 6 TABLET | Refills: 0 | Status: SHIPPED | OUTPATIENT
Start: 2025-01-17

## 2025-01-17 RX ORDER — PREDNISONE 20 MG/1
TABLET ORAL
Qty: 15 TABLET | Refills: 0 | Status: SHIPPED | OUTPATIENT
Start: 2025-01-17

## 2025-01-17 RX ORDER — DEXAMETHASONE SODIUM PHOSPHATE 10 MG/ML
8 INJECTION INTRAMUSCULAR; INTRAVENOUS ONCE
Status: COMPLETED | OUTPATIENT
Start: 2025-01-17 | End: 2025-01-17

## 2025-01-17 RX ADMIN — DEXAMETHASONE SODIUM PHOSPHATE 10 MG: 10 INJECTION INTRAMUSCULAR; INTRAVENOUS at 09:53

## 2025-01-17 ASSESSMENT — ENCOUNTER SYMPTOMS
HEMOPTYSIS: 0
SHORTNESS OF BREATH: 1
WHEEZING: 1
SPUTUM PRODUCTION: 1
COUGH: 1

## 2025-01-17 NOTE — PATIENT INSTRUCTIONS
Dexamethasone 8 mg IM was administered.  Albuterol 2.5 mg was administered via nebulizer.    Begin Trelegy inhaler, 200, 1 inhalation every morning, gargle with water after use.  Prescription is sent to her pharmacy.  Advised to obtain online coupon or assistance from pharmacist for coupon.  Advised to contact us if Trelegy is not covered by insurance.    OTC mucolytic, (mucinex or generic equivalent of guaifenesin), 2400mg in 24 hours in divided doses as needed to thin mucous.  Can take OTC mucinex lozenges as she is doing, which is actually dextromethorphan.    Prednisone taper beginning tomorrow morning after breakfast.  Refrain from taking over-the-counter nonsteroidals while on prednisone, such as ibuprofen, Motrin, Advil, Aleve, naproxen.    Zithromax as prescribed.    If she has significant improvement, proceed with screening CT as ordered on 1/29/2025.  However, advised to hold on screening send if she is still not well.  Radiology scheduling dept # is provided if necessary to re-schedule chest CT ([phone])

## 2025-01-26 DIAGNOSIS — R09.82 POST-NASAL DRIP: ICD-10-CM

## 2025-01-27 RX ORDER — FLUTICASONE PROPIONATE 50 MCG
2 SPRAY, SUSPENSION (ML) NASAL DAILY
Qty: 3 EACH | Refills: 3 | Status: SHIPPED | OUTPATIENT
Start: 2025-01-27

## 2025-01-27 NOTE — TELEPHONE ENCOUNTER
Refill request on:  fluticasone (FLONASE) 50 MCG - 2 sprays by Nasal route daily     LOV - 7/18/24  Next appt - 7/18/25    RX pended   Thank you!

## 2025-01-29 ENCOUNTER — HOSPITAL ENCOUNTER (OUTPATIENT)
Dept: CT IMAGING | Age: 65
Discharge: HOME | End: 2025-01-31
Payer: OTHER GOVERNMENT

## 2025-01-29 DIAGNOSIS — Z87.891 PERSONAL HISTORY OF TOBACCO USE: ICD-10-CM

## 2025-01-29 PROCEDURE — 71271 CT THORAX LUNG CANCER SCR C-: CPT

## 2025-01-30 ENCOUNTER — TELEPHONE (OUTPATIENT)
Dept: PULMONOLOGY | Age: 65
End: 2025-01-30

## 2025-01-30 RX ORDER — DOXYCYCLINE HYCLATE 100 MG
100 TABLET ORAL 2 TIMES DAILY
Qty: 20 TABLET | Refills: 0 | Status: SHIPPED | OUTPATIENT
Start: 2025-01-30 | End: 2025-02-09

## 2025-01-30 NOTE — TELEPHONE ENCOUNTER
Agree w/ viral testing, may need to be evaluated at urgent care.  Agree with other recommendations.    Doxycycline 100mg po bid for 10 days, # 14, no refill.  I sent in to her pharmacy.    If her sinus symptoms fail to improve, should follow up with primary MD, urgent care or Lola Almonte NP, her usual provider here.  (I saw her as sick work in visit.)

## 2025-01-30 NOTE — TELEPHONE ENCOUNTER
TRIAGE CALL      Complaint: SINUS congestion   Cough: yes  Productive:  yes/ yellow/green  Bloody Sputum:  no  Increased SOB/Wheezing:  sob  Duration: 3 days    Fever/Chills: chills  OTC Meds tried: no       Just finishing her meds for lung infection and now her sinuses are infected . The  mucus she is blowing out her nose is yellow/green also .

## 2025-01-30 NOTE — TELEPHONE ENCOUNTER
Last Seen: 1/17/25  COPD, Centrilobar Emphysema, Ac Bronchitis, Cough  Administered Dexamethasone Injection, Albuterol nebulizer in office and dispensed Zpack and Prednisone to continue at home.    Patient called today complaining of sinus congestion with yellow/green nasal discharge.  Still having cough, occasional chills and has now developed a fever today.  Patient states that her temperature was 102 today.  Asked patient if she had any viral testing available at home and she stated that her  was going to be be bringing home a test this afternoon.  I recommended that patient try to get a combo test if possible to rule out as many viruses as possible.  Patient is still using her Flonase nasal spray, Mucinex cough and sinus meds and cough drops but has finished her prednisone and abx.  I advised patient to take tylenol and advil for fever but reminded her to use caution with the other OTC cold medications as they do occasionally have fever reducing medications in them and not to double dose herself.  Patient will call back if her testing comes back positive.  I advised patient that I will forward this message to the provider to see if there is anything else that we can recommend for her at this time.

## 2025-01-30 NOTE — TELEPHONE ENCOUNTER
Called patient to inform of Veena Locke NP recommendations and patient stated that she had already gone and picked up the prescription.  Patient also noted that she had tested herself and was negative for Flu A, B and Covid.  Advised patient that if she did not improve after this round of antibiotics, she would need to be seen, whether by our practice, her PCP or urgent care and if patient felt at anytime she was getting worse she should seek medical care sooner at urgent care or ER.  Patient will callback if any additional questions or concerns.

## 2025-02-07 ENCOUNTER — TELEPHONE (OUTPATIENT)
Dept: PULMONOLOGY | Age: 65
End: 2025-02-07

## 2025-02-07 NOTE — PROGRESS NOTES
[facility]  [address]  [place] [address]  [phone]    Patient Name:  Agustina Victoria    YOB: 1960    Office Visit 2/10/2025      CHIEF COMPLAINT:      Chief Complaint   Patient presents with    Cough         ASSESSMENT:   (Medical Decision Making)                                                                                                                                          Encounter Diagnoses   Name Primary?    COPD exacerbation (HCC) Yes    Acute bronchitis, unspecified organism     Centrilobular emphysema (HCC)     Chronic obstructive pulmonary disease, unspecified COPD type (HCC)     Lung nodule     Bronchiectasis without complication (HCC)      She has had recurrence of nonproductive cough, has wheezing and shortness of breath above baseline.  She reports some improvement when she was on prednisone and Zithromax.  Having difficulty sleeping secondary to cough.    She reports nausea associated with codeine and hydrocodone.  Will try ninja cof.    Mild emphysematous changes note on CT.  Given recent exacerbations requiring prednisone, She was begun on Trelegy inhaler at her last work in visit but reports that she had swelling of her lips so discontinued it.  At this point, she has albuterol HFA.  Would benefit from nebulizer RX if needed.    Recent screening CT demonstrated stable 4 mm RUL nodule.  There was minimal bronchiectasis.  She has questions regarding CT which were addressed.  She has follow-up with her usual provider in 10 days as well.                      PLAN:     Prednisone taper as prescribed-slower taper than previous course.  Zithromax as prescribed.    Albuterol inhaler 2 puffs 4 times daily until she receives nebulizer, then albuterol 4 times daily via nebulizer for shortness of breath, wheezing, cough/chest congestion and mucus clearance.    OTC mucolytic, (mucinex or generic equivalent of guaifenesin), 2400mg in 24

## 2025-02-07 NOTE — TELEPHONE ENCOUNTER
Patient has an infection in her lung and is on her second round of antibiotics. She does not she is doing better at all, still coughing,short of breath and wheezing.

## 2025-02-07 NOTE — TELEPHONE ENCOUNTER
Patient called and reported still SOB and coughing.  When returned patient's call, patient stated that she can't stop coughing, she is SOB and is using her Albuterol inhaler every 4 hours.  She is having to sleep in her recliner so that she is upright. She has a decreased appetite also because she states that it feels like her chest is tight.  Patient also reports that she stopped using her Trelegy inhaler as after 7 days, she woke up and her lips were swollen.  Patient also noted that her CT results mentioned that she had Bronchiectasis and she was wondering what that was.  I informed her of those results and answered her questions.  Recommended that she come in to see one of our providers since she has been struggling with these issues for over a month now and has tried several rounds of antibiotics and steroids with no improvement.  Since patient has just had a CT scan done, I did not order a CXR but advised patient if the provider wants a CXR done prior to her appointment, I would call her back to advise her.  Patient scheduled an appointment for Monday 02/10/25 with an arrival time of 1325.  Patient will call back with any additional questions or concerns prior to her appointment.

## 2025-02-10 ENCOUNTER — OFFICE VISIT (OUTPATIENT)
Dept: PULMONOLOGY | Age: 65
End: 2025-02-10
Payer: OTHER GOVERNMENT

## 2025-02-10 VITALS
SYSTOLIC BLOOD PRESSURE: 105 MMHG | HEIGHT: 66 IN | HEART RATE: 100 BPM | WEIGHT: 130.6 LBS | BODY MASS INDEX: 20.99 KG/M2 | RESPIRATION RATE: 14 BRPM | TEMPERATURE: 97.3 F | OXYGEN SATURATION: 95 % | DIASTOLIC BLOOD PRESSURE: 75 MMHG

## 2025-02-10 DIAGNOSIS — J20.9 ACUTE BRONCHITIS, UNSPECIFIED ORGANISM: ICD-10-CM

## 2025-02-10 DIAGNOSIS — J47.9 BRONCHIECTASIS WITHOUT COMPLICATION (HCC): ICD-10-CM

## 2025-02-10 DIAGNOSIS — J44.9 CHRONIC OBSTRUCTIVE PULMONARY DISEASE, UNSPECIFIED COPD TYPE (HCC): ICD-10-CM

## 2025-02-10 DIAGNOSIS — J43.2 CENTRILOBULAR EMPHYSEMA (HCC): ICD-10-CM

## 2025-02-10 DIAGNOSIS — R91.1 LUNG NODULE: ICD-10-CM

## 2025-02-10 DIAGNOSIS — R05.2 SUBACUTE COUGH: ICD-10-CM

## 2025-02-10 DIAGNOSIS — J44.1 COPD EXACERBATION (HCC): Primary | ICD-10-CM

## 2025-02-10 PROCEDURE — 99214 OFFICE O/P EST MOD 30 MIN: CPT | Performed by: NURSE PRACTITIONER

## 2025-02-10 PROCEDURE — 3078F DIAST BP <80 MM HG: CPT | Performed by: NURSE PRACTITIONER

## 2025-02-10 PROCEDURE — 3074F SYST BP LT 130 MM HG: CPT | Performed by: NURSE PRACTITIONER

## 2025-02-10 PROCEDURE — 96372 THER/PROPH/DIAG INJ SC/IM: CPT | Performed by: NURSE PRACTITIONER

## 2025-02-10 RX ORDER — CHLOPHEDIANOL HCL AND PYRILAMINE MALEATE 12.5; 12.5 MG/5ML; MG/5ML
SOLUTION ORAL
Qty: 250 ML | Refills: 0 | Status: SHIPPED | OUTPATIENT
Start: 2025-02-10

## 2025-02-10 RX ORDER — DEXAMETHASONE SODIUM PHOSPHATE 10 MG/ML
8 INJECTION INTRAMUSCULAR; INTRAVENOUS ONCE
Status: COMPLETED | OUTPATIENT
Start: 2025-02-10 | End: 2025-02-10

## 2025-02-10 RX ORDER — ALBUTEROL SULFATE 0.83 MG/ML
SOLUTION RESPIRATORY (INHALATION)
Qty: 360 ML | Refills: 3 | Status: SHIPPED | OUTPATIENT
Start: 2025-02-10

## 2025-02-10 RX ORDER — PREDNISONE 20 MG/1
TABLET ORAL
Qty: 20 TABLET | Refills: 0 | Status: SHIPPED | OUTPATIENT
Start: 2025-02-10

## 2025-02-10 RX ORDER — AZITHROMYCIN 250 MG/1
TABLET, FILM COATED ORAL
Qty: 6 TABLET | Refills: 0 | Status: SHIPPED | OUTPATIENT
Start: 2025-02-10

## 2025-02-10 RX ADMIN — DEXAMETHASONE SODIUM PHOSPHATE 8 MG: 10 INJECTION INTRAMUSCULAR; INTRAVENOUS at 15:15

## 2025-02-10 ASSESSMENT — ENCOUNTER SYMPTOMS
SPUTUM PRODUCTION: 0
SHORTNESS OF BREATH: 1
HEMOPTYSIS: 0
COUGH: 0
WHEEZING: 1

## 2025-02-10 NOTE — PATIENT INSTRUCTIONS
Dexamethasone 8 mg IM was administered.    Prednisone taper as prescribed-slower taper than previous course.  Zithromax as prescribed.    Albuterol inhaler 2 puffs 4 times daily until she receives nebulizer, then albuterol 4 times daily via nebulizer for shortness of breath, wheezing, cough/chest congestion and mucus clearance.

## 2025-02-20 ENCOUNTER — OFFICE VISIT (OUTPATIENT)
Age: 65
End: 2025-02-20
Payer: OTHER GOVERNMENT

## 2025-02-20 VITALS
HEIGHT: 66 IN | HEART RATE: 100 BPM | BODY MASS INDEX: 21.41 KG/M2 | SYSTOLIC BLOOD PRESSURE: 120 MMHG | TEMPERATURE: 97 F | RESPIRATION RATE: 17 BRPM | WEIGHT: 133.2 LBS | DIASTOLIC BLOOD PRESSURE: 74 MMHG | OXYGEN SATURATION: 97 %

## 2025-02-20 DIAGNOSIS — J47.9 BRONCHIECTASIS WITHOUT COMPLICATION (HCC): ICD-10-CM

## 2025-02-20 DIAGNOSIS — Z87.891 PERSONAL HISTORY OF TOBACCO USE: ICD-10-CM

## 2025-02-20 DIAGNOSIS — R91.1 LUNG NODULE: ICD-10-CM

## 2025-02-20 DIAGNOSIS — J43.2 CENTRILOBULAR EMPHYSEMA (HCC): Primary | ICD-10-CM

## 2025-02-20 LAB
EXPIRATORY TIME: NORMAL
FEF 25-75% %PRED-PRE: NORMAL
FEF 25-75% PRED: NORMAL
FEF 25-75-PRE: NORMAL
FEV1 %PRED-PRE: 87 %
FEV1 PRED: 2.59 L
FEV1/FVC %PRED-PRE: NORMAL
FEV1/FVC PRED: NORMAL
FEV1/FVC: 83 %
FEV1: 2.26 L
FVC %PRED-PRE: 83 %
FVC PRED: 3.31 L
FVC: 2.74 L
PEF %PRED-PRE: NORMAL
PEF PRED: NORMAL
PEF-PRE: NORMAL

## 2025-02-20 PROCEDURE — 3074F SYST BP LT 130 MM HG: CPT | Performed by: NURSE PRACTITIONER

## 2025-02-20 PROCEDURE — 3078F DIAST BP <80 MM HG: CPT | Performed by: NURSE PRACTITIONER

## 2025-02-20 PROCEDURE — 99214 OFFICE O/P EST MOD 30 MIN: CPT | Performed by: NURSE PRACTITIONER

## 2025-02-20 PROCEDURE — G0296 VISIT TO DETERM LDCT ELIG: HCPCS | Performed by: NURSE PRACTITIONER

## 2025-02-20 RX ORDER — ALBUTEROL SULFATE 90 UG/1
2 INHALANT RESPIRATORY (INHALATION) 4 TIMES DAILY PRN
Qty: 3 EACH | Refills: 3 | Status: SHIPPED | OUTPATIENT
Start: 2025-02-20

## 2025-02-20 ASSESSMENT — PULMONARY FUNCTION TESTS
FVC_PERCENT_PREDICTED_PRE: 83
FEV1_PERCENT_PREDICTED_PRE: 87
FEV1/FVC: 83
FEV1_PREDICTED: 2.59
FVC_PREDICTED: 3.31
FEV1: 2.26
FVC: 2.74

## 2025-02-20 NOTE — H&P (VIEW-ONLY)
HISTORY  The patient's brother and uncle passed away from cancer.    MEDICATIONS  Current: albuterol inhaler, Trelegy        REVIEW OF SYSTEMS: 10 point review of systems is negative except as reported in HPI.    PHYSICAL EXAM: Body mass index is 21.5 kg/m².  Vitals:    02/20/25 0830   BP: 120/74   Pulse: 100   Resp: 17   Temp: 97 °F (36.1 °C)   TempSrc: Temporal   SpO2: 97%   Weight: 60.4 kg (133 lb 3.2 oz)   Height: 1.676 m (5' 6\")         General:   Alert, cooperative, no distress, appears stated age.        Eyes:   Conjunctivae/corneas clear. PERRL        Mouth/Throat:  Lips, mucosa, and tongue normal. Teeth and gums normal.        Lungs:     Breath sounds with a few scattered rhonchi that clear after coughing.     Heart:   Regular rate and rhythm, S1, S2 normal, no murmur, click, rub or gallop.     Abdomen:    Soft, non-tender.     Extremities:  Extremities normal, atraumatic, no cyanosis or edema.     Skin:  Skin color normal. No rashes or lesions     Neurologic:  A&Ox3     DIAGNOSTIC TESTS:                                                                                    LABS:   Lab Results   Component Value Date/Time    WBC 7.2 07/08/2024 08:41 AM    HGB 15.2 07/08/2024 08:41 AM    HCT 44.2 07/08/2024 08:41 AM     07/08/2024 08:41 AM    TSH 2.960 07/08/2024 08:41 AM     Imaging: I performed an independent interpretation of the patient's images.  CXR:   XR CHEST (2 VW) 12/26/2024    Narrative  PROCEDURE INFORMATION:  Exam: XR Chest  Exam date and time: 12/26/2024 8:38 AM  Age: 64 years old  Clinical indication: Subacute cough    TECHNIQUE:  Imaging protocol: Radiologic exam of the chest.  Views: 2 views.    COMPARISON:  CR (CHEST, ) 12/21/2023 3:25 PM    FINDINGS:  Lungs: Unremarkable. No consolidation.  Pleural spaces: Unremarkable. No pleural effusion. No pneumothorax.  Heart/Mediastinum: Unremarkable. No cardiomegaly.  Bones/joints: Fusion hardware in the cervical spine.    Intraperitoneal space:

## 2025-02-20 NOTE — PATIENT INSTRUCTIONS
Albuterol 2 puffs twice daily.  At first onset of increased cough/congestion, increase to 4 times daily.    Mucinex 1200 mg twice daily.      I have ordered CT of chest due AFTER 1/29/26.  You should receive a call from scheduling by mid January.  If you do not hear from them, please call [phone] to schedule your test.      Learning About Lung Cancer Screening  What is screening for lung cancer?     Lung cancer screening is a way to find some lung cancers early, before a person has any symptoms of the cancer.  Lung cancer screening may help those who have the highest risk for lung cancer--people age 50 and older who are or were heavy smokers. For most people, who aren't at increased risk, screening for lung cancer probably isn't helpful.  Screening won't prevent cancer. And it may not find all lung cancers. Lung cancer screening may lower the risk of dying from lung cancer in a small number of people.  How is it done?  Lung cancer screening is done with a low-dose CT (computed tomography) scan. A CT scan uses X-rays, or radiation, to make detailed pictures of your body. Experts recommend that screening be done in medical centers that focus on finding and treating lung cancer.  Who is screening recommended for?  Lung cancer screening is recommended for people age 50 and older who are or were heavy smokers. That means people with a smoking history of at least 20 pack years. A pack year is a way to measure how heavy a smoker you are or were.  To figure out your pack years, multiply how many packs a day on average (assuming 20 cigarettes per pack) you have smoked by how many years you have smoked. For example:  If you smoked 1 pack a day for 20 years, that's 1 times 20. So you have a smoking history of 20 pack years.  If you smoked 2 packs a day for 10 years, that's 2 times 10. So you have a smoking history of 20 pack years.  Experts agree that screening is for people who have a high risk of lung cancer. But

## 2025-02-20 NOTE — PROGRESS NOTES
Immunization History   Administered Date(s) Administered    COVID-19, MODERNA BLUE border, Primary or Immunocompromised, (age 12y+), IM, 100 mcg/0.5mL 2021, 10/02/2021, 10/29/2021    Hep A, HAVRIX, VAQTA, (age 12m-18y), IM, 0.5mL 2008    Hepatitis B 2007, 2008     Past Medical History:   Diagnosis Date    Acquired hypothyroidism 2018    Hepatitis C 2015    treatment completed    Hypertension     Polycythemia     Precancerous skin lesion     Bilateral arms and hand    Pure hypercholesterolemia 2018    Thyroid disease         Tobacco Use      Smoking status: Former        Packs/day: 0.00        Years: 0.5 packs/day for 40.0 years (20.0 ttl pk-yrs)        Types: Cigarettes        Start date: 1982        Quit date: 2022        Years since quittin.6      Smokeless tobacco: Never    Allergies   Allergen Reactions    Clavulanic Acid Anaphylaxis, Angioedema, Diarrhea, Headaches and Myalgia    Keflex [Cephalexin] Angioedema     Lips swelling    Sulfa Antibiotics Anaphylaxis    Trelegy Ellipta [Fluticasone-Umeclidin-Vilant] Swelling     Lips Swelling     Current Outpatient Medications   Medication Instructions    albuterol (PROVENTIL) (2.5 MG/3ML) 0.083% nebulizer solution 3 ml via nebulizer qid prn shortness of breath, wheezing, cough, mucous clearance    albuterol sulfate HFA (VENTOLIN HFA) 108 (90 Base) MCG/ACT inhaler 2 puffs, Inhalation, 4 TIMES DAILY PRN    azithromycin (ZITHROMAX) 250 MG tablet TAKE 2 TABS ON DAY 1, THEN 1 TAB A DAY FOR 4 DAYS    Chlophedianol-Pyrilamine (NINJACOF) 12.5-12.5 MG/5ML LIQD 2 teaspoon q6-8h, do not exceed 6 teaspoon in 24 hours    Cyanocobalamin (VITAMIN B-12 PO) 1,000 mcg, Oral, DAILY    estrogens (conjugated) (PREMARIN) 0.625 mg, Oral, DAILY    fluticasone (FLONASE) 50 MCG/ACT nasal spray 2 sprays, Nasal, DAILY    fluticasone-umeclidin-vilant (TRELEGY ELLIPTA) 200-62.5-25 MCG/ACT AEPB inhaler 1 inhalation every morning, rinse mouth after

## 2025-03-05 ENCOUNTER — TELEPHONE (OUTPATIENT)
Dept: PULMONOLOGY | Age: 65
End: 2025-03-05

## 2025-03-05 NOTE — TELEPHONE ENCOUNTER
TRIAGE CALL      Complaint: mucus, cough  Cough: yes   Productive:  no   Bloody Sputum:  no  Increased SOB/Wheezing:  yes both   Duration: since January 3  Fever/Chills: chills   OTC Meds tried:   Musinex

## 2025-03-06 NOTE — TELEPHONE ENCOUNTER
Last seen: 2/20/25  Hx: emphysema, bronchiectasis, nodule    COPD exacerbation in January and has not returned to baseline.   Patient call reporting ongoing mucous & cough, sob & wheezing, some chills, taking mucinex.  Asking, per recommendation of daughter who is a nurse, that she have a procedure. Recognized name of bronchoscopy to suction out mucous and help determine type of infection.   Please advise if patient should be scheduled for clean out procedure.

## 2025-03-06 NOTE — TELEPHONE ENCOUNTER
When I saw her on 2/20/25 (2 weeks ago), she was not at baseline, but was doing better.    Had CT 1/2024 which did not show any mucus plugging.    I'd add saline to nebulizer bid (in addition to albuterol, but separate from albuterol) and flutter valve 2-4 times daily.    Can schedule bronch for mucus clearing, but schedule it in at least 2 weeks to give the saline an opportunity to work.

## 2025-03-07 ENCOUNTER — PREP FOR PROCEDURE (OUTPATIENT)
Dept: PULMONOLOGY | Age: 65
End: 2025-03-07

## 2025-03-07 DIAGNOSIS — T17.500A MUCUS PLUGGING OF BRONCHI: ICD-10-CM

## 2025-03-07 RX ORDER — SODIUM CHLORIDE FOR INHALATION 3 %
4 VIAL, NEBULIZER (ML) INHALATION 2 TIMES DAILY PRN
Qty: 240 ML | Refills: 0 | Status: SHIPPED | OUTPATIENT
Start: 2025-03-07

## 2025-03-07 NOTE — TELEPHONE ENCOUNTER
Advised patient of NP recommendations, ordered saline for nebulizer to local pharmacy and provided information for flutter valve in [facility] to order independently.   Set up for bronch w/ BAL on 3/18 w/ Dr. Cutler.

## 2025-03-10 PROBLEM — T17.500A MUCUS PLUGGING OF BRONCHI: Status: ACTIVE | Noted: 2025-03-07

## 2025-03-10 RX ORDER — SODIUM CHLORIDE 9 MG/ML
INJECTION, SOLUTION INTRAVENOUS PRN
Status: CANCELLED | OUTPATIENT
Start: 2025-03-10

## 2025-03-10 RX ORDER — SODIUM CHLORIDE 0.9 % (FLUSH) 0.9 %
5-40 SYRINGE (ML) INJECTION EVERY 12 HOURS SCHEDULED
Status: CANCELLED | OUTPATIENT
Start: 2025-03-10

## 2025-03-10 RX ORDER — SODIUM CHLORIDE 0.9 % (FLUSH) 0.9 %
5-40 SYRINGE (ML) INJECTION PRN
Status: CANCELLED | OUTPATIENT
Start: 2025-03-10

## 2025-03-18 ENCOUNTER — HOSPITAL ENCOUNTER (OUTPATIENT)
Age: 65
Discharge: HOME | End: 2025-03-18
Attending: STUDENT IN AN ORGANIZED HEALTH CARE EDUCATION/TRAINING PROGRAM | Admitting: STUDENT IN AN ORGANIZED HEALTH CARE EDUCATION/TRAINING PROGRAM
Payer: OTHER GOVERNMENT

## 2025-03-18 ENCOUNTER — ANESTHESIA (OUTPATIENT)
Dept: ENDOSCOPY | Age: 65
End: 2025-03-18
Payer: OTHER GOVERNMENT

## 2025-03-18 ENCOUNTER — ANESTHESIA EVENT (OUTPATIENT)
Dept: ENDOSCOPY | Age: 65
End: 2025-03-18
Payer: OTHER GOVERNMENT

## 2025-03-18 VITALS
BODY MASS INDEX: 21.38 KG/M2 | HEART RATE: 94 BPM | WEIGHT: 133 LBS | SYSTOLIC BLOOD PRESSURE: 114 MMHG | RESPIRATION RATE: 16 BRPM | HEIGHT: 66 IN | OXYGEN SATURATION: 94 % | DIASTOLIC BLOOD PRESSURE: 62 MMHG | TEMPERATURE: 97.5 F

## 2025-03-18 LAB
DIFFERENTIAL: NORMAL
EOSINOPHIL NFR BRONCH MANUAL: 5 %
LYMPHOCYTES NFR BRONCH MANUAL: 11 %
MACROPHAGES NFR BRONCH MANUAL: 19 %
NEUTROPHILS NFR BRONCH MANUAL: 65 %
OTHER CELLS NFR BLD MANUAL: 31

## 2025-03-18 PROCEDURE — 31624 DX BRONCHOSCOPE/LAVAGE: CPT | Performed by: STUDENT IN AN ORGANIZED HEALTH CARE EDUCATION/TRAINING PROGRAM

## 2025-03-18 PROCEDURE — 3700000000 HC ANESTHESIA ATTENDED CARE: Performed by: STUDENT IN AN ORGANIZED HEALTH CARE EDUCATION/TRAINING PROGRAM

## 2025-03-18 PROCEDURE — 7100000011 HC PHASE II RECOVERY - ADDTL 15 MIN: Performed by: STUDENT IN AN ORGANIZED HEALTH CARE EDUCATION/TRAINING PROGRAM

## 2025-03-18 PROCEDURE — 88112 CYTOPATH CELL ENHANCE TECH: CPT

## 2025-03-18 PROCEDURE — 88305 TISSUE EXAM BY PATHOLOGIST: CPT

## 2025-03-18 PROCEDURE — 87070 CULTURE OTHR SPECIMN AEROBIC: CPT

## 2025-03-18 PROCEDURE — 7100000010 HC PHASE II RECOVERY - FIRST 15 MIN: Performed by: STUDENT IN AN ORGANIZED HEALTH CARE EDUCATION/TRAINING PROGRAM

## 2025-03-18 PROCEDURE — 3609010800 HC BRONCHOSCOPY ALVEOLAR LAVAGE: Performed by: STUDENT IN AN ORGANIZED HEALTH CARE EDUCATION/TRAINING PROGRAM

## 2025-03-18 PROCEDURE — 31645 BRNCHSC W/THER ASPIR 1ST: CPT | Performed by: STUDENT IN AN ORGANIZED HEALTH CARE EDUCATION/TRAINING PROGRAM

## 2025-03-18 PROCEDURE — 87205 SMEAR GRAM STAIN: CPT

## 2025-03-18 PROCEDURE — 87116 MYCOBACTERIA CULTURE: CPT

## 2025-03-18 PROCEDURE — 87206 SMEAR FLUORESCENT/ACID STAI: CPT

## 2025-03-18 PROCEDURE — 7100000000 HC PACU RECOVERY - FIRST 15 MIN: Performed by: STUDENT IN AN ORGANIZED HEALTH CARE EDUCATION/TRAINING PROGRAM

## 2025-03-18 PROCEDURE — 7100000001 HC PACU RECOVERY - ADDTL 15 MIN: Performed by: STUDENT IN AN ORGANIZED HEALTH CARE EDUCATION/TRAINING PROGRAM

## 2025-03-18 PROCEDURE — 87102 FUNGUS ISOLATION CULTURE: CPT

## 2025-03-18 PROCEDURE — 3700000001 HC ADD 15 MINUTES (ANESTHESIA): Performed by: STUDENT IN AN ORGANIZED HEALTH CARE EDUCATION/TRAINING PROGRAM

## 2025-03-18 PROCEDURE — 6360000002 HC RX W HCPCS

## 2025-03-18 PROCEDURE — 2709999900 HC NON-CHARGEABLE SUPPLY: Performed by: STUDENT IN AN ORGANIZED HEALTH CARE EDUCATION/TRAINING PROGRAM

## 2025-03-18 PROCEDURE — 89051 BODY FLUID CELL COUNT: CPT

## 2025-03-18 RX ORDER — ONDANSETRON 2 MG/ML
4 INJECTION INTRAMUSCULAR; INTRAVENOUS
Status: DISCONTINUED | OUTPATIENT
Start: 2025-03-18 | End: 2025-03-18 | Stop reason: HOSPADM

## 2025-03-18 RX ORDER — SODIUM CHLORIDE, SODIUM LACTATE, POTASSIUM CHLORIDE, CALCIUM CHLORIDE 600; 310; 30; 20 MG/100ML; MG/100ML; MG/100ML; MG/100ML
INJECTION, SOLUTION INTRAVENOUS CONTINUOUS
Status: DISCONTINUED | OUTPATIENT
Start: 2025-03-18 | End: 2025-03-18 | Stop reason: HOSPADM

## 2025-03-18 RX ORDER — OXYCODONE HYDROCHLORIDE 5 MG/1
5 TABLET ORAL PRN
Status: DISCONTINUED | OUTPATIENT
Start: 2025-03-18 | End: 2025-03-18 | Stop reason: HOSPADM

## 2025-03-18 RX ORDER — OXYCODONE HYDROCHLORIDE 5 MG/1
10 TABLET ORAL PRN
Status: DISCONTINUED | OUTPATIENT
Start: 2025-03-18 | End: 2025-03-18 | Stop reason: HOSPADM

## 2025-03-18 RX ORDER — SODIUM CHLORIDE 0.9 % (FLUSH) 0.9 %
5-40 SYRINGE (ML) INJECTION EVERY 12 HOURS SCHEDULED
Status: DISCONTINUED | OUTPATIENT
Start: 2025-03-18 | End: 2025-03-18 | Stop reason: HOSPADM

## 2025-03-18 RX ORDER — SODIUM CHLORIDE 0.9 % (FLUSH) 0.9 %
5-40 SYRINGE (ML) INJECTION PRN
Status: DISCONTINUED | OUTPATIENT
Start: 2025-03-18 | End: 2025-03-18 | Stop reason: HOSPADM

## 2025-03-18 RX ORDER — LIDOCAINE HYDROCHLORIDE 10 MG/ML
1 INJECTION, SOLUTION INFILTRATION; PERINEURAL
Status: DISCONTINUED | OUTPATIENT
Start: 2025-03-18 | End: 2025-03-18 | Stop reason: HOSPADM

## 2025-03-18 RX ORDER — PROCHLORPERAZINE EDISYLATE 5 MG/ML
5 INJECTION INTRAMUSCULAR; INTRAVENOUS
Status: DISCONTINUED | OUTPATIENT
Start: 2025-03-18 | End: 2025-03-18 | Stop reason: HOSPADM

## 2025-03-18 RX ORDER — SODIUM CHLORIDE 9 MG/ML
INJECTION, SOLUTION INTRAVENOUS PRN
Status: DISCONTINUED | OUTPATIENT
Start: 2025-03-18 | End: 2025-03-18 | Stop reason: HOSPADM

## 2025-03-18 RX ORDER — DIPHENHYDRAMINE HYDROCHLORIDE 50 MG/ML
12.5 INJECTION, SOLUTION INTRAMUSCULAR; INTRAVENOUS
Status: DISCONTINUED | OUTPATIENT
Start: 2025-03-18 | End: 2025-03-18 | Stop reason: HOSPADM

## 2025-03-18 RX ORDER — NALOXONE HYDROCHLORIDE 0.4 MG/ML
INJECTION, SOLUTION INTRAMUSCULAR; INTRAVENOUS; SUBCUTANEOUS PRN
Status: DISCONTINUED | OUTPATIENT
Start: 2025-03-18 | End: 2025-03-18 | Stop reason: HOSPADM

## 2025-03-18 RX ORDER — PROPOFOL 10 MG/ML
INJECTION, EMULSION INTRAVENOUS
Status: DISCONTINUED | OUTPATIENT
Start: 2025-03-18 | End: 2025-03-18 | Stop reason: SDUPTHER

## 2025-03-18 RX ADMIN — PROPOFOL 20 MG: 10 INJECTION, EMULSION INTRAVENOUS at 13:49

## 2025-03-18 RX ADMIN — PROPOFOL 30 MG: 10 INJECTION, EMULSION INTRAVENOUS at 13:59

## 2025-03-18 RX ADMIN — PROPOFOL 100 MCG/KG/MIN: 10 INJECTION, EMULSION INTRAVENOUS at 13:56

## 2025-03-18 ASSESSMENT — PAIN - FUNCTIONAL ASSESSMENT: PAIN_FUNCTIONAL_ASSESSMENT: 0-10

## 2025-03-18 ASSESSMENT — ENCOUNTER SYMPTOMS: SHORTNESS OF BREATH: 1

## 2025-03-18 NOTE — OP NOTE
03/18/25  PROCEDURE: Broncho-Aveolar Lavage (CPT 38680)  and Initial Therapeutic Aspiration of Secretions (CPT 30254)     EQUIPMENT: Olympus  Bronchoscope    INDICATION   Chronic productive cough    ANESTHESIA  MAC anesthesia w/ Anesthesiology. Please see MAR for medication record.    IMAGING:  See H&P    AIRWAY INSPECTION  After obtaining informed consent, the bronchoscope was introduced into the trachea through the POM mask without complication.  The airway exam showed diffusely chronically inflamed mucosa with scant secretions though a small amount of thick mucous was aspirated from the L mainstem bronchus. The remainder of the airway exam showed no significant abnormality.     In summary, the following samples were obtained:    BAL of the RLL w/ good return: CC, cx (bacterial, AFB, fungal), cytology  Bronchial Wash: cx    The procedure was completed without complication and the patient tolerated the procedure well.    EBL: 0    Recommendations: F/u results, f/u cx, f/u in clinic    Juwan Cutler MD

## 2025-03-18 NOTE — INTERVAL H&P NOTE
Update History & Physical    The patient's History and Physical of February 20, 2025 was reviewed with the patient and I examined the patient. There was no change. The surgical site was confirmed by the patient and me.     Pt is a 64 y.o. F w/ a hx of former tobacco abuse (~25 pack/yrs, quit 2023), bronchiectasis, emphysema (normal ida 2022, 2019) on triple therapy, 4mm pulmonary nodule who presents for Bronchoscopy for BAL.      Plan per documentation is for bronchoscopy under MAC sedation w/ airway inspection and BAL of RLL,.      LDCT Chest 2/2/25  Imaging is w/o main airway mucous but there is some tree in bud noted in the periphery, for instance in the R base:      Electronically signed by Juwan Cutler MD on 3/18/2025 at 12:06 PM

## 2025-03-18 NOTE — ANESTHESIA POSTPROCEDURE EVALUATION
Department of Anesthesiology  Postprocedure Note    Patient: Agustina Victoria  MRN: [id number]  YOB: 1960  Date of evaluation: 3/18/2025    Procedure Summary       Date: 03/18/25 Room / Location: [facility] ENDO FLOURO 1 / [facility] ENDOSCOPY    Anesthesia Start: 1346 Anesthesia Stop: 1410    Procedures:       BRONCHOSCOPY (Chest)      BRONCHOSCOPY ALVEOLAR LAVAGE (Chest) Diagnosis:       Mucus plugging of bronchi      (Mucus plugging of bronchi [T17.500A])    Surgeons: Juwan Cutler MD Responsible Provider: Jeffrey Cano IV, MD    Anesthesia Type: TIVA ASA Status: 3            Anesthesia Type: No value filed.    Bea Phase I: Bea Score: 9    Bea Phase II:      Anesthesia Post Evaluation    Patient location during evaluation: PACU  Patient participation: complete - patient participated  Level of consciousness: awake and alert  Airway patency: patent  Nausea & Vomiting: no nausea and no vomiting  Cardiovascular status: hemodynamically stable  Respiratory status: acceptable, nonlabored ventilation and spontaneous ventilation  Hydration status: euvolemic  Comments: BP (!) 115/58   Pulse (!) 102   Temp 97.5 °F (36.4 °C) (Infrared)   Resp 16   Ht 1.676 m (5' 6\")   Wt 60.3 kg (133 lb)   SpO2 94%   BMI 21.47 kg/m²     Multimodal analgesia pain management approach  Pain management: adequate and satisfactory to patient    No notable events documented.

## 2025-03-18 NOTE — DISCHARGE INSTRUCTIONS
RESPIRATORY CARE - BRONCHOSCOPY - DISCHARGE INSTRUCTIONS      You received a lot of numbing medication for your throat and nose, and you also received medication to make you sleepy during your procedure.  Because of this and because the bronchoscopy may have irritated your airways, we ask that you follow these directions:    1.         Do not eat or drink for 2 hours after your procedure .   After that, you may have what you please.               You may want to try some liquids first, because your throat may be a little sore.    2. Medication may cause drowsiness for several hours, therefore, do not drive or operate machinery for remainder of the day.  No alcohol today.    3. You may cough up more mucus than usual and you may see some blood, but this is expected and should subside by the following day.    4. If severe throat irritation, coughing, or bleeding continue, call your doctor.    5.         If you run a fever greater than 102, call [facility] at [phone].        MEDICATION INTERACTION:  During your procedure you potentially received a medication or medications which may reduce the effectiveness of oral contraceptives. Please consider other forms of contraception for 1 month following your procedure if you are currently using oral contraceptives as your primary form of birth control. In addition to this, we recommend continuing your oral contraceptive as prescribed, unless otherwise instructed by your physician, during this time    After general anesthesia or intravenous sedation, for 24 hours or while taking prescription Narcotics:  Limit your activities  A responsible adult needs to be with you for the next 24 hours  Do not drive and operate hazardous machinery  Do not make important personal or business decisions  Do not drink alcoholic beverages  If you have not urinated within 8 hours after discharge, and you are experiencing discomfort from urinary retention, please go to the nearest ED.  If

## 2025-03-18 NOTE — ANESTHESIA PRE PROCEDURE
Evaluated by [id number] [facility] Provider          [facility] [address] [facility] ED  EMERGENCY DEPARTMENT ENCOUNTER        Pt Name: Elisa Herrera  MRN: [id number]  Jodygfsandra 1974  Dateof evaluation: 8/2/2021  Provider: OTONIEL Hicks - CNP  PCP: Fay Beltran MD  ED Attending: No att. providers found    [facility]       Chief Complaint   Patient presents with    Dizziness     patient states she has been light headed and dizzy this day, states she has had a decrease PO intake because she does not feel well.  Diarrhea     has had diarrhea x3 days. also c/o nausea       HISTORY OF PRESENTILLNESS   (Location/Symptom, Timing/Onset, Context/Setting, Quality, Duration, Modifying Factors, Severity)  Note limiting factors. Elisa Herrera is a 41 y.o. female for dizziness and diarrhea. Onset was today. Context includes patient reports that she has had lightheadedness in which she says the room is spinning today. Movement does not make her dizziness worse patient states that she just does not feel well. Patient has had diarrhea as well for the last 3 days but denies any C. difficile risk factors. Patient has had nausea but no vomiting. Alleviating factors include nothing. Aggravating factors include nothing. Pain is 0/10. Nothing has been used for pain today. Nursing Notes were all reviewed and agreed with or any disagreements were addressed  in the HPI. REVIEW OF SYSTEMS    (2-9 systems for level 4, 10 or more for level 5)     Review of Systems   Constitutional: Negative for fever. HENT: Negative for congestion, rhinorrhea and sore throat. Respiratory: Negative for shortness of breath. Cardiovascular: Negative for chest pain. Gastrointestinal: Positive for diarrhea and nausea. Negative for abdominal pain and vomiting. Genitourinary: Negative for decreased urine volume and difficulty urinating. Musculoskeletal: Negative for arthralgias and myalgias.    Skin: Negative for color change and rash. Neurological: Positive for dizziness. Negative for light-headedness. Psychiatric/Behavioral: Negative for agitation. All other systems reviewed and are negative. Positives and Pertinent negatives as per HPI. Except as noted above in the ROS, all other systems were reviewed and negative. PAST MEDICAL HISTORY     Past Medical History:   Diagnosis Date    DDD (degenerative disc disease), cervical     Hyperlipidemia     Seizures ([facility] [address]     epilisy         SURGICAL HISTORY       Past Surgical History:   Procedure Laterality Date     SECTION      NOSE SURGERY           CURRENT MEDICATIONS       Discharge Medication List as of 2021  7:39 PM      CONTINUE these medications which have NOT CHANGED    Details   levETIRAcetam (KEPPRA) 1000 MG tablet Take 1 tablet by mouth 2 times daily, Disp-180 tablet, R-1Normal      Prenatal Vit-Fe Fumarate-FA (PRENATAL VITAMINS) 28-0.8 MG TABS TAKE 1 TABLET BY MOUTH EVERY DAYHistorical Med      folic acid (FOLVITE) 1 MG tablet Take 3 mg by mouth dailyHistorical Med      calcium carbonate (OSCAL) 500 MG TABS tablet Take 500 mg by mouth dailyHistorical Med      Multiple Vitamins-Minerals (THERAPEUTIC MULTIVITAMIN-MINERALS) tablet Take 1 tablet by mouth dailyHistorical Med               ALLERGIES     Codeine    FAMILY HISTORY     No family history on file. SOCIAL HISTORY       Social History     Socioeconomic History    Marital status:      Spouse name: Not on file    Number of children: Not on file    Years of education: Not on file    Highest education level: Not on file   Occupational History    Not on file   Tobacco Use    Smoking status: Current Some Day Smoker     Packs/day: 0.50    Smokeless tobacco: Never Used   Substance and Sexual Activity    Alcohol use:  Yes     Alcohol/week: 0.0 standard drinks     Comment: rarely    Drug use: Yes     Types: Marijuana     Comment: yesterday 5/10/15    Sexual activity: Not Currently     Comment: occassionally   Other Topics Concern    Not on file   Social History Narrative    Not on file     Social Determinants of Health     Financial Resource Strain:     Difficulty of Paying Living Expenses:    Food Insecurity:     Worried About Running Out of Food in the Last Year:     [address] in the Last Year:    Transportation Needs:     Lack of Transportation (Medical):  Lack of Transportation (Non-Medical):    Physical Activity:     Days of Exercise per Week:     Minutes of Exercise per Session:    Stress:     Feeling of Stress :    Social Connections:     Frequency of Communication with Friends and Family:     Frequency of Social Gatherings with Friends and Family:     Attends Orthodoxy Services:     Active Member of Clubs or Organizations:     Attends Club or Organization Meetings:     Marital Status:    Intimate Partner Violence:     Fear of Current or Ex-Partner:     Emotionally Abused:     Physically Abused:     Sexually Abused:        SCREENINGS   NIH Stroke Scale  Interval: Baseline  Level of Consciousness (1a. ): Alert  LOC Questions (1b. ):  Answers both correctly  LOC Commands (1c. ): Performs both tasks correctly  Best Gaze (2. ): Normal  Visual (3. ): No visual loss  Facial Palsy (4. ): Normal symmetrical movement  Motor Arm, Left (5a. ): No drift  Motor Arm, Right (5b. ): No drift  Motor Leg, Left (6a. ): No drift  Motor Leg, Right (6b. ): No drift  Limb Ataxia (7. ): Absent  Sensory (8. ): Normal  Best Language (9. ): No aphasia  Dysarthria (10. ): Normal  Extinction and Inattention (11): No abnormality  Total: 0Glasgow Coma Scale  Eye Opening: Spontaneous  Best Verbal Response: Oriented  Best Motor Response: Obeys commands  Brittany Coma Scale Score: 15        PHYSICAL EXAM  (up to 7 for level 4, 8 or more for level 5)     ED Triage Vitals [08/02/21 1544]   BP Temp Temp Source Pulse Resp SpO2 Height Weight   (!) 112/96 97.8 °F (36.6 °C) Oral 51 16 100 % 5' 5\" (1.651 m) 150 lb (68 kg)       Physical Exam  Constitutional:       Appearance: She is well-developed. HENT:      Head: Normocephalic and atraumatic. Eyes:      Extraocular Movements: Extraocular movements intact. Pupils: Pupils are equal, round, and reactive to light. Cardiovascular:      Rate and Rhythm: Normal rate. Pulmonary:      Effort: Pulmonary effort is normal. No respiratory distress. Abdominal:      General: There is no distension. Palpations: Abdomen is soft. Tenderness: There is no abdominal tenderness. Musculoskeletal:         General: Normal range of motion. Cervical back: Normal range of motion. Skin:     General: Skin is warm and dry. Neurological:      General: No focal deficit present. Mental Status: She is alert and oriented to person, place, and time. Cranial Nerves: No cranial nerve deficit. Sensory: No sensory deficit.          DIAGNOSTIC RESULTS   LABS:    Labs Reviewed   CBC WITH AUTO DIFFERENTIAL - Abnormal; Notable for the following components:       Result Value    WBC 16.8 (*)     Neutrophils Absolute 13.6 (*)     All other components within normal limits    Narrative:     Performed at:  [facility] [address],  [place], [facility]   Phone [phone]   COMPREHENSIVE METABOLIC PANEL W/ REFLEX TO MG FOR LOW K - Abnormal; Notable for the following components:    Glucose 107 (*)     Total Protein 6.2 (*)     AST 14 (*)     All other components within normal limits    Narrative:     Performed at:  [facility]) - [facility] [address],  [email or web address], [facility]   Phone [phone]   URINE RT REFLEX TO CULTURE - Abnormal; Notable for the following components:    Blood, Urine TRACE-INTACT (*)     All other components within normal limits    Narrative:     Performed at:  [facility]) - [facility] [address],  [place], [place]   Phone [phone]   D-DIMER, QUANTITATIVE - Abnormal; Notable for the following components:    D-Dimer, Quant 262 (*)     All other components within normal limits    Narrative:     Performed at:  [facility] [address],  [place], [facility]   Phone [phone]   [address] - Abnormal; Notable for the following components:    Cannabinoid Scrn, Ur POSITIVE (*)     All other components within normal limits    Narrative:     Performed at:  [place] [address],  [place], [facility]   Phone [phone]   MICROSCOPIC URINALYSIS - Abnormal; Notable for the following components:    Mucus, UA 2+ (*)     Bacteria, UA 2+ (*)     All other components within normal limits    Narrative:     Performed at:  [place] [address],  [place], [facility]   Phone [phone]   HCG, SERUM, QUALITATIVE    Narrative:     Performed at:  [place] [address],  [place], [facility]   Phone [phone]   LEVETIRACETAM LEVEL    Narrative:     Performed at:  [facility]  [address] [facility] [address]   Phone [phone]       All other labs werewithin normal range or not returned as of this dictation. EKG: All EKG's are interpreted by the Emergency Department Physician who either signs or Co-signs this chart in the absence of a cardiologist.  Please see their note for interpretation of EKG. RADIOLOGY:           Interpretation per the Radiologist below, if available at the time of this note:    CT CHEST PULMONARY EMBOLISM W CONTRAST   Final Result   1. No CT evidence of a pulmonary embolism. 2. Mild dependent atelectasis within both lower lobes, without acute airspace   consolidation.    3. Mosaic attenuation and air trapping within both lower lobes, right greater   than left, likely secondary to chronic small airways Applicable):  Lab Results   Component Value Date/Time    HEPCAB REACTIVE 07/07/2023 08:04 AM       COVID-19 Screening (If Applicable):   Lab Results   Component Value Date/Time    COVID19 Not detected 12/02/2023 08:02 AM           Anesthesia Evaluation  Patient summary reviewed and Nursing notes reviewed   no history of anesthetic complications:   Airway: Mallampati: II  TM distance: >3 FB   Neck ROM: full  Mouth opening: > = 3 FB   Dental: normal exam   (+) partials      Pulmonary:normal exam    (+)  COPD:  shortness of breath:                                    Cardiovascular:  Exercise tolerance: no interval change  (+) hypertension:    (-) CAD and no hyperlipidemia      Rhythm: regular  Rate: normal                 ROS comment: Normal echo in 2018     Neuro/Psych:      (-) seizures, neuromuscular disease and CVA           GI/Hepatic/Renal:   (+) GERD:, hepatitis: C          Endo/Other:        (-) diabetes mellitus, hypothyroidism, hyperthyroidism               Abdominal:             Vascular: negative vascular ROS.         Other Findings:             Anesthesia Plan      TIVA     ASA 3       Induction: intravenous.      Anesthetic plan and risks discussed with patient.    Use of blood products discussed with patient whom consented to blood products.                      RYANN VELARDE MD   3/18/2025             disease. 4. Multiple nodular densities within the bilateral breast parenchyma. Recommend correlation with bilateral breast mammography. XR CHEST (2 VW)   Final Result   No active cardiopulmonary disease           XR CHEST (2 VW)    Result Date: 8/2/2021  EXAMINATION: TWO XRAY VIEWS OF THE CHEST 8/2/2021 4:30 pm COMPARISON: 09/20/2020 HISTORY: ORDERING SYSTEM PROVIDED HISTORY: Chest Discomfort TECHNOLOGIST PROVIDED HISTORY: Reason for exam:->Chest Discomfort Reason for Exam: dizzy and lightheaded, low heart rate FINDINGS: Heart size and pulmonary vasculature within normal limits. Lungs clear. Costophrenic angles sharp     No active cardiopulmonary disease         PROCEDURES   Unless otherwise noted below, none     Procedures     CRITICAL CARE TIME   N/A    CONSULTS:  None      EMERGENCYDEPARTMENT COURSE and DIFFERENTIAL DIAGNOSIS/MDM:   Vitals:    Vitals:    08/02/21 1749 08/02/21 1759 08/02/21 1818 08/02/21 1943   BP:   122/66 110/69   Pulse: (!) 44 (!) 43 55 50   Resp:    16   Temp:       TempSrc:       SpO2: 97% 99% 100% 100%   Weight:       Height:           Patient was given the following medications:  Medications   iopamidol (ISOVUE-370) 76 % injection 85 mL (85 mLs Intravenous Given 8/2/21 1848)     Patient was seen and evaluated by myself. Patient here for complaints of dizziness. Patient reports that she has had diarrhea for the last few days however has no risk factors for C. difficile. She denies any fever but has had nausea and vomiting. Patient does report that her dizziness is positional. Lab values have been reviewed and interpreted. CT for PE is currently pending. Patient ultimately will be charged once CT PE is reviewed. Report was given to Bouchra CABALLERO to follow-up on CT and final disposition of the patient. The patient tolerated their visit well. I have evaluated this patient. My supervising physician was available for consultation.  The patient and / or the family were informed of the results of any tests, a time was given to answer questions, a plan was proposed and they agreed with plan. FINAL IMPRESSION      1. Nausea vomiting and diarrhea    2.  Lightheadedness          DISPOSITION/PLAN   DISPOSITION Decision To Discharge 08/02/2021 07:34:59 PM      PATIENT REFERRED TO:  Lizzy Saucedo MD  [address] [facility] [place]   [phone]    Schedule an appointment as soon as possible for a visit in 1 day  As needed, If symptoms worsen    [facility] ED  [address] [facility] [address]  Schedule an appointment as soon as possible for a visit   As needed, If symptoms worsen      DISCHARGE MEDICATIONS:  Discharge Medication List as of 8/2/2021  7:39 PM      START taking these medications    Details   ondansetron (ZOFRAN) 4 MG tablet Take 1 tablet by mouth every 8 hours as needed for Nausea, Disp-20 tablet, R-0Normal             DISCONTINUED MEDICATIONS:  Discharge Medication List as of 8/2/2021  7:39 PM                 (Please note that portions of this note were completed with a voice recognition program.  Efforts were made to edit the dictations but occasionally words are mis-transcribed.)    OTONIEL Syed CNP (electronically signed)         OTONIEL Syed CNP  08/21/21 [address], APRN - CNP  08/21/21 9985

## 2025-03-19 ENCOUNTER — RESULTS FOLLOW-UP (OUTPATIENT)
Dept: ENDOSCOPY | Age: 65
End: 2025-03-19

## 2025-03-20 LAB
ACID FAST STN SPEC: NEGATIVE
MYCOBACTERIUM SPEC QL CULT: NORMAL
SPECIMEN PREPARATION: NORMAL
SPECIMEN SOURCE: NORMAL

## 2025-03-20 RX ORDER — PREDNISONE 20 MG/1
TABLET ORAL
Qty: 15 TABLET | Refills: 0 | Status: SHIPPED | OUTPATIENT
Start: 2025-03-20

## 2025-03-21 ENCOUNTER — PATIENT MESSAGE (OUTPATIENT)
Age: 65
End: 2025-03-21

## 2025-03-21 LAB
BACTERIA SPEC CULT: NORMAL
BACTERIA SPEC CULT: NORMAL
FUNGAL CULT/SMEAR: NORMAL
FUNGUS SMEAR: NORMAL
GRAM STN SPEC: NORMAL
SERVICE CMNT-IMP: NORMAL
SERVICE CMNT-IMP: NORMAL
SPECIMEN PROCESSING: NORMAL
SPECIMEN SOURCE: NORMAL
SPECIMEN SOURCE: NORMAL

## 2025-03-24 LAB
FUNGUS SMEAR: NORMAL
SPECIMEN SOURCE: NORMAL

## 2025-04-15 ENCOUNTER — TELEPHONE (OUTPATIENT)
Dept: PULMONOLOGY | Age: 65
End: 2025-04-15

## 2025-04-15 DIAGNOSIS — J47.1 BRONCHIECTASIS WITH ACUTE EXACERBATION (HCC): Primary | ICD-10-CM

## 2025-04-15 RX ORDER — PREDNISONE 20 MG/1
TABLET ORAL
Qty: 15 TABLET | Refills: 0 | Status: SHIPPED | OUTPATIENT
Start: 2025-04-15

## 2025-04-15 NOTE — TELEPHONE ENCOUNTER
Last seen: 2/20/25  Hx: emphysema, bronchiectasis, nodule        Sick call 3/5, provided saline nebs, flutter valve and set up for bronch for mucous plugging.    Patient call reporting return of symptoms, deducting that infection is back again.  Contacted patient to clarify symptoms, notes she is tired of same symptoms since December, notes she did better for about 2 weeks after bronch; wheezing, gurgling, productive cough w/ green sputum. Using nebulizer again BID started back 3 days ago, taking mucinex 600mg once a day; advised to increased to 1200mg BID. Has been using inhaler & flonase the entire time.   Asking for steroid for sure & if antibiotics are needed.     Asking if something can be called in, willing to come in for appt if needed. Confirmed pharmacy on file.

## 2025-04-15 NOTE — TELEPHONE ENCOUNTER
She has bronchiectasis which will cause mucus production.  Please explain disease process with her again.    Agree with your recommendations.  May repeat steroids, but before starting steroids, recommend CBC to check for eosinophilia.  May need Dupixent, etc.  Also could consider a percussion vest.  If WBC are elevated on CBC then we can consider antibiotics.

## 2025-04-15 NOTE — TELEPHONE ENCOUNTER
Patient says that the infection is back again. She says she is having the same symptoms as before .

## 2025-04-16 ENCOUNTER — PATIENT MESSAGE (OUTPATIENT)
Dept: INTERNAL MEDICINE CLINIC | Facility: CLINIC | Age: 65
End: 2025-04-16

## 2025-04-16 DIAGNOSIS — R91.1 LUNG NODULE: Primary | ICD-10-CM

## 2025-04-16 DIAGNOSIS — Z86.19 HISTORY OF RECURRENT PULMONARY INFECTION: ICD-10-CM

## 2025-04-18 LAB
FUNGAL CULT/SMEAR: NORMAL
FUNGUS (MYCOLOGY) CULTURE: NEGATIVE
FUNGUS SMEAR: NORMAL
REFLEX TO ID: NORMAL
SPECIMEN PROCESSING: NORMAL
SPECIMEN SOURCE: NORMAL
SPECIMEN SOURCE: NORMAL

## 2025-04-28 NOTE — TELEPHONE ENCOUNTER
CBC was to be drawn prior to starting steroids.  Needs to wait now 14 days post steroids before getting this drawn.

## 2025-04-28 NOTE — TELEPHONE ENCOUNTER
Contacted patient to check current status, she took the steroid course and has finished about 8 days ago, feels better but not at baseline, still some wheezing & able use inhaler.     Advised to have CBC drawn to determine if speciality medication, like duxpient, is needed.   Advised next steps will be determined by lab results.

## 2025-04-29 DIAGNOSIS — J47.1 BRONCHIECTASIS WITH ACUTE EXACERBATION (HCC): ICD-10-CM

## 2025-04-29 LAB
BASOPHILS # BLD: 0.08 K/UL (ref 0–0.2)
BASOPHILS NFR BLD: 0.9 % (ref 0–2)
DIFFERENTIAL METHOD BLD: ABNORMAL
EOSINOPHIL # BLD: 0.47 K/UL (ref 0–0.8)
EOSINOPHIL NFR BLD: 5.4 % (ref 0.5–7.8)
ERYTHROCYTE [DISTWIDTH] IN BLOOD BY AUTOMATED COUNT: 13.6 % (ref 11.9–14.6)
HCT VFR BLD AUTO: 41.9 % (ref 35.8–46.3)
HGB BLD-MCNC: 14.4 G/DL (ref 11.7–15.4)
IMM GRANULOCYTES # BLD AUTO: 0.02 K/UL (ref 0–0.5)
IMM GRANULOCYTES NFR BLD AUTO: 0.2 % (ref 0–5)
LYMPHOCYTES # BLD: 2.89 K/UL (ref 0.5–4.6)
LYMPHOCYTES NFR BLD: 33.4 % (ref 13–44)
MCH RBC QN AUTO: 34 PG (ref 26.1–32.9)
MCHC RBC AUTO-ENTMCNC: 34.4 G/DL (ref 31.4–35)
MCV RBC AUTO: 98.8 FL (ref 82–102)
MONOCYTES # BLD: 0.64 K/UL (ref 0.1–1.3)
MONOCYTES NFR BLD: 7.4 % (ref 4–12)
NEUTS SEG # BLD: 4.54 K/UL (ref 1.7–8.2)
NEUTS SEG NFR BLD: 52.7 % (ref 43–78)
NRBC # BLD: 0 K/UL (ref 0–0.2)
PLATELET # BLD AUTO: 271 K/UL (ref 150–450)
PMV BLD AUTO: 10.7 FL (ref 9.4–12.3)
RBC # BLD AUTO: 4.24 M/UL (ref 4.05–5.2)
WBC # BLD AUTO: 8.6 K/UL (ref 4.3–11.1)

## 2025-04-30 ENCOUNTER — RESULTS FOLLOW-UP (OUTPATIENT)
Dept: PULMONOLOGY | Age: 65
End: 2025-04-30

## 2025-04-30 NOTE — RESULT ENCOUNTER NOTE
Please let patient know that CBC is normal--antibiotics are not indicated.  However, her eosinophil count is elevated and would likely qualify for Dupixent, an injectable medication for her lung disease.  Please arrange CPFTs and get her to sign paperwork, give information on Dupixent.  Once that is completed and if she agrees, we will check with insurance to see if it is covered.  Thanks.

## 2025-05-02 LAB
ACID FAST STN SPEC: NEGATIVE
MYCOBACTERIUM SPEC QL CULT: NEGATIVE
SPECIMEN PREPARATION: NORMAL
SPECIMEN SOURCE: NORMAL

## 2025-05-06 ENCOUNTER — TELEPHONE (OUTPATIENT)
Dept: PULMONOLOGY | Age: 65
End: 2025-05-06

## 2025-05-06 NOTE — TELEPHONE ENCOUNTER
New Enrollment for Dupixent - COPD   on 4/29/25 05/13/2025 1222  Loraine Franklin  Called [facility] to complete a verbal PA. Case ID: [id number]. Was told that the PA was denied for COPD diagnosis but can submit an appeal.     Appeal will be started.    5/16/25  Received email from Loraine Franklin with [facility] with Appeal Letter and Consent form for patients signature. I have emailed the consent form to the patient for her signature and placed the appeal letter in Dr. Lucas's Blue Folder. I spoke with the patient and informed her that we would need the consent signed before we can process the appeal. Patient states understanding and will get it back to me once she is back from her Vacation.

## 2025-05-07 ENCOUNTER — RESULTS FOLLOW-UP (OUTPATIENT)
Dept: PULMONOLOGY | Age: 65
End: 2025-05-07

## 2025-05-07 ENCOUNTER — TELEPHONE (OUTPATIENT)
Dept: PULMONOLOGY | Age: 65
End: 2025-05-07

## 2025-05-07 ENCOUNTER — CLINICAL SUPPORT (OUTPATIENT)
Age: 65
End: 2025-05-07
Payer: OTHER GOVERNMENT

## 2025-05-07 DIAGNOSIS — J47.1 BRONCHIECTASIS WITH ACUTE EXACERBATION (HCC): Primary | ICD-10-CM

## 2025-05-07 LAB
FEV 1 , POC: 1.73 L
FEV1 % PRED, POC: 69 %
FEV1/FVC, POC: NORMAL
FVC % PRED, POC: 72 %
FVC, POC: NORMAL

## 2025-05-07 PROCEDURE — 94060 EVALUATION OF WHEEZING: CPT | Performed by: INTERNAL MEDICINE

## 2025-05-07 PROCEDURE — 94729 DIFFUSING CAPACITY: CPT | Performed by: INTERNAL MEDICINE

## 2025-05-07 PROCEDURE — 94726 PLETHYSMOGRAPHY LUNG VOLUMES: CPT | Performed by: INTERNAL MEDICINE

## 2025-05-07 ASSESSMENT — PULMONARY FUNCTION TESTS
FEV1_PERCENT_PREDICTED_POC: 69
FVC_PERCENT_PREDICTED_POC: 72

## 2025-05-07 NOTE — RESULT ENCOUNTER NOTE
Please let patient know that breathing tests are consistent with MILD COPD.  Normally I would put a patient on an inhaler, but since she had an allergic reaction to Trelegy, I am hesitant to use anything other than albuterol.  Albuterol and her saline should be sufficient at this time.  Proceed with starting Dupixent.  Hopefully this will decrease her cough and decrease flare ups.

## 2025-05-07 NOTE — TELEPHONE ENCOUNTER
She has bronchiectasis and this cough will likely be life long.    Did she get the patient information packet for the Dupixent and or watch the video?    Please follow up with the patient to assess her symptoms better

## 2025-05-07 NOTE — TELEPHONE ENCOUNTER
Patient came into my lab for [facility] and was asking about the Dupixent forms that she had to fill out. She was concerened about taking something that she does not know how it will effect her. She may need more information on why she will be taking this medication. She also states that the wheezing, coughing and SOB with mucus production. Mucus has been clear to tan in color. She has tried several cough medicines without any relief. She is planning to go to the beach on 05/15/25 and would like to have some relief from this before going.

## 2025-05-08 ENCOUNTER — APPOINTMENT (OUTPATIENT)
Dept: URBAN - METROPOLITAN AREA CLINIC 329 | Facility: CLINIC | Age: 65
Setting detail: DERMATOLOGY
End: 2025-05-08

## 2025-05-08 DIAGNOSIS — L82.0 INFLAMED SEBORRHEIC KERATOSIS: ICD-10-CM

## 2025-05-08 DIAGNOSIS — J43.2 CENTRILOBULAR EMPHYSEMA (HCC): Primary | ICD-10-CM

## 2025-05-08 PROCEDURE — 17110 DESTRUCTION B9 LES UP TO 14: CPT

## 2025-05-08 PROCEDURE — ? LIQUID NITROGEN

## 2025-05-08 PROCEDURE — ? COUNSELING

## 2025-05-08 ASSESSMENT — LOCATION DETAILED DESCRIPTION DERM
LOCATION DETAILED: RIGHT INFERIOR POSTERIOR NECK
LOCATION DETAILED: RIGHT SUPERIOR POSTERIOR NECK
LOCATION DETAILED: RIGHT INFERIOR POSTERIOR NECK

## 2025-05-08 ASSESSMENT — LOCATION SIMPLE DESCRIPTION DERM
LOCATION SIMPLE: POSTERIOR NECK
LOCATION SIMPLE: POSTERIOR NECK

## 2025-05-08 ASSESSMENT — LOCATION ZONE DERM
LOCATION ZONE: NECK
LOCATION ZONE: NECK

## 2025-05-08 NOTE — TELEPHONE ENCOUNTER
Contacted patient, notes she has researched the medication; states the current cough is affecting her daily life greatly, only sleeping 2-3 hours a night, sometimes causes her to vomit; a 24/7 problem, intermittent fevers, wheezing, gurgling intermittently; feels like symptoms are more severe than provider realizes. Frustrated that she will not be able to enjoy her planned vacation next week.   Steroids help, even though she doesn't want to continue them; ninjacof didn't help; hasn't had steroids for 2 weeks and coughing is just incessant, hasn't slept in her bed for 6 months.   Taking 2 mucinex tabs BID, using nebulizer 3-4 times a day w/ just albuterol. Signed paperwork for [employer] yesterday.     Asking if she can have steroids for the next week or so to cover vacation. Asking if there is absolutely anything else she can take to reduce the amount of coughing she is doing.

## 2025-05-09 DIAGNOSIS — R09.82 POST-NASAL DRIP: ICD-10-CM

## 2025-05-09 RX ORDER — PREDNISONE 10 MG/1
10 TABLET ORAL DAILY
Qty: 21 TABLET | Refills: 0 | Status: SHIPPED | OUTPATIENT
Start: 2025-05-09 | End: 2025-05-09

## 2025-05-09 RX ORDER — DOXYCYCLINE HYCLATE 100 MG
100 TABLET ORAL 2 TIMES DAILY
Qty: 14 TABLET | Refills: 0 | Status: SHIPPED | OUTPATIENT
Start: 2025-05-09 | End: 2025-05-16

## 2025-05-09 RX ORDER — PREDNISONE 10 MG/1
10 TABLET ORAL DAILY
Qty: 21 TABLET | Refills: 0 | Status: SHIPPED | OUTPATIENT
Start: 2025-05-09

## 2025-05-09 NOTE — TELEPHONE ENCOUNTER
Reviewed MD recommendations w/ patient, voiced gratitude. Has albuterol, saline & flutter valve.

## 2025-05-13 NOTE — TELEPHONE ENCOUNTER
Refill request on:  fluticasone (FLONASE) 50 MCG/ACT     LOV- 7/18/24  Next appt- 6/11/25    RX pended   Thank you!     [facility] as per [facility] prescription was [facility]. No prescription on file at this time.

## 2025-05-15 RX ORDER — FLUTICASONE PROPIONATE 50 MCG
2 SPRAY, SUSPENSION (ML) NASAL DAILY
Qty: 3 EACH | Refills: 3 | Status: SHIPPED | OUTPATIENT
Start: 2025-05-15

## 2025-06-07 DIAGNOSIS — J43.2 CENTRILOBULAR EMPHYSEMA (HCC): ICD-10-CM

## 2025-06-07 DIAGNOSIS — J44.9 CHRONIC OBSTRUCTIVE PULMONARY DISEASE, UNSPECIFIED COPD TYPE (HCC): ICD-10-CM

## 2025-06-12 NOTE — TELEPHONE ENCOUNTER
Called patient to see if she plans to sign the consent to appeal form for the Dupixent. She states that she is getting a second option and is not sure she wants to pursue the medication at this time. Lola Almonte will be informed. I will inform Jimenez Lawson at [facility] to put the script on hold for now.

## 2025-06-18 DIAGNOSIS — J44.9 CHRONIC OBSTRUCTIVE PULMONARY DISEASE, UNSPECIFIED COPD TYPE (HCC): ICD-10-CM

## 2025-06-18 DIAGNOSIS — J43.2 CENTRILOBULAR EMPHYSEMA (HCC): ICD-10-CM

## 2025-06-18 RX ORDER — ALBUTEROL SULFATE 0.83 MG/ML
SOLUTION RESPIRATORY (INHALATION)
Qty: 360 ML | Refills: 3 | Status: SHIPPED | OUTPATIENT
Start: 2025-06-18

## 2025-06-18 RX ORDER — ALBUTEROL SULFATE 0.83 MG/ML
SOLUTION RESPIRATORY (INHALATION)
Qty: 360 ML | Refills: 3 | Status: SHIPPED | OUTPATIENT
Start: 2025-06-18 | End: 2025-06-18

## 2025-06-18 RX ORDER — ALBUTEROL SULFATE 0.83 MG/ML
SOLUTION RESPIRATORY (INHALATION)
Qty: 360 ML | Refills: 3 | Status: CANCELLED | OUTPATIENT
Start: 2025-06-18

## 2025-07-14 RX ORDER — FLUTICASONE FUROATE, UMECLIDINIUM BROMIDE AND VILANTEROL TRIFENATATE 200; 62.5; 25 UG/1; UG/1; UG/1
1 POWDER RESPIRATORY (INHALATION) DAILY
Qty: 3 EACH | Refills: 3 | Status: SHIPPED | OUTPATIENT
Start: 2025-07-14

## 2025-07-14 NOTE — TELEPHONE ENCOUNTER
Patient Refill Request     Last Office Visit: 02/20/2025 with Lola CERDA     When they were supposed to follow up: 6 months     Upcoming Office Visit: 08/21/2025 with Lola CERDA     Refills Requested: Trelegy 200 mcg     Type of refill: 90 day     Requested Pharmacy: [facility]      Is prescription pended? Yes

## 2025-07-21 RX ORDER — EVOLOCUMAB 140 MG/ML
INJECTION, SOLUTION SUBCUTANEOUS
Qty: 6 ML | Refills: 3 | Status: SHIPPED | OUTPATIENT
Start: 2025-07-21

## 2025-07-21 NOTE — TELEPHONE ENCOUNTER
Requested Prescriptions     Pending Prescriptions Disp Refills    REPATHA SURECLICK SOAJ pen [Pharmacy Med Name: REPATHA SURECLICK PEN 1ML 2'S 140MG] 6 mL 3     Sig: INJECT 140 MG UNDER THE SKIN EVERY 14 DAYS

## 2025-08-15 ENCOUNTER — LAB (OUTPATIENT)
Dept: FAMILY MEDICINE CLINIC | Facility: CLINIC | Age: 65
End: 2025-08-15

## 2025-08-15 DIAGNOSIS — E55.9 VITAMIN D DEFICIENCY: ICD-10-CM

## 2025-08-15 DIAGNOSIS — Z79.890 POSTMENOPAUSAL HRT (HORMONE REPLACEMENT THERAPY): ICD-10-CM

## 2025-08-15 DIAGNOSIS — E03.9 ACQUIRED HYPOTHYROIDISM: ICD-10-CM

## 2025-08-15 DIAGNOSIS — Z00.00 ENCOUNTER FOR ANNUAL PHYSICAL EXAM: ICD-10-CM

## 2025-08-15 DIAGNOSIS — I10 PRIMARY HYPERTENSION: ICD-10-CM

## 2025-08-15 DIAGNOSIS — E78.00 PURE HYPERCHOLESTEROLEMIA: ICD-10-CM

## 2025-08-15 DIAGNOSIS — R73.01 IMPAIRED FASTING BLOOD SUGAR: ICD-10-CM

## 2025-08-15 LAB
25(OH)D3 SERPL-MCNC: 46.5 NG/ML (ref 30–100)
ALBUMIN SERPL-MCNC: 4.3 G/DL (ref 3.2–4.6)
ALBUMIN/GLOB SERPL: 1.3 (ref 1–1.9)
ALP SERPL-CCNC: 62 U/L (ref 35–104)
ALT SERPL-CCNC: 18 U/L (ref 8–45)
ANION GAP SERPL CALC-SCNC: 14 MMOL/L (ref 7–16)
AST SERPL-CCNC: 28 U/L (ref 15–37)
BASOPHILS # BLD: 0.06 K/UL (ref 0–0.2)
BASOPHILS NFR BLD: 0.7 % (ref 0–2)
BILIRUB SERPL-MCNC: 0.6 MG/DL (ref 0–1.2)
BUN SERPL-MCNC: 12 MG/DL (ref 8–23)
CALCIUM SERPL-MCNC: 10.1 MG/DL (ref 8.8–10.2)
CHLORIDE SERPL-SCNC: 102 MMOL/L (ref 98–107)
CHOLEST SERPL-MCNC: 121 MG/DL (ref 0–200)
CO2 SERPL-SCNC: 22 MMOL/L (ref 20–29)
CREAT SERPL-MCNC: 0.75 MG/DL (ref 0.6–1.1)
DIFFERENTIAL METHOD BLD: ABNORMAL
EOSINOPHIL # BLD: 0.05 K/UL (ref 0–0.8)
EOSINOPHIL NFR BLD: 0.6 % (ref 0.5–7.8)
ERYTHROCYTE [DISTWIDTH] IN BLOOD BY AUTOMATED COUNT: 12.9 % (ref 11.9–14.6)
EST. AVERAGE GLUCOSE BLD GHB EST-MCNC: 109 MG/DL
GLOBULIN SER CALC-MCNC: 3.2 G/DL (ref 2.3–3.5)
GLUCOSE SERPL-MCNC: 84 MG/DL (ref 70–99)
HBA1C MFR BLD: 5.4 % (ref 0–5.6)
HCT VFR BLD AUTO: 43 % (ref 35.8–46.3)
HDLC SERPL-MCNC: 62 MG/DL (ref 40–60)
HDLC SERPL: 2 (ref 0–5)
HGB BLD-MCNC: 14.4 G/DL (ref 11.7–15.4)
IMM GRANULOCYTES # BLD AUTO: 0.03 K/UL (ref 0–0.5)
IMM GRANULOCYTES NFR BLD AUTO: 0.3 % (ref 0–5)
LDLC SERPL CALC-MCNC: 30 MG/DL (ref 0–100)
LYMPHOCYTES # BLD: 2.24 K/UL (ref 0.5–4.6)
LYMPHOCYTES NFR BLD: 25.4 % (ref 13–44)
MCH RBC QN AUTO: 34.1 PG (ref 26.1–32.9)
MCHC RBC AUTO-ENTMCNC: 33.5 G/DL (ref 31.4–35)
MCV RBC AUTO: 101.9 FL (ref 82–102)
MONOCYTES # BLD: 0.68 K/UL (ref 0.1–1.3)
MONOCYTES NFR BLD: 7.7 % (ref 4–12)
NEUTS SEG # BLD: 5.76 K/UL (ref 1.7–8.2)
NEUTS SEG NFR BLD: 65.3 % (ref 43–78)
NRBC # BLD: 0 K/UL (ref 0–0.2)
PLATELET # BLD AUTO: 308 K/UL (ref 150–450)
PMV BLD AUTO: 10.2 FL (ref 9.4–12.3)
POTASSIUM SERPL-SCNC: 4.7 MMOL/L (ref 3.5–5.1)
PROT SERPL-MCNC: 7.5 G/DL (ref 6.3–8.2)
RBC # BLD AUTO: 4.22 M/UL (ref 4.05–5.2)
SODIUM SERPL-SCNC: 139 MMOL/L (ref 136–145)
TRIGL SERPL-MCNC: 148 MG/DL (ref 0–150)
TSH, 3RD GENERATION: 2.67 UIU/ML (ref 0.27–4.2)
VLDLC SERPL CALC-MCNC: 30 MG/DL (ref 6–23)
WBC # BLD AUTO: 8.8 K/UL (ref 4.3–11.1)

## (undated) DEVICE — KIT SURG CUST BRONCHSCP CUST SFD

## (undated) DEVICE — SINGLE USE SUCTION VALVE MAJ-209: Brand: SINGLE USE SUCTION VALVE (STERILE)

## (undated) DEVICE — SINGLE USE BIOPSY VALVE MAJ-210: Brand: SINGLE USE BIOPSY VALVE (STERILE)

## (undated) DEVICE — MOUTHPIECE ENDOSCP L CTRL OPN AND SIDE PORTS DISP